# Patient Record
Sex: FEMALE | Race: WHITE | NOT HISPANIC OR LATINO | URBAN - METROPOLITAN AREA
[De-identification: names, ages, dates, MRNs, and addresses within clinical notes are randomized per-mention and may not be internally consistent; named-entity substitution may affect disease eponyms.]

---

## 2017-02-04 ENCOUNTER — OUTPATIENT (OUTPATIENT)
Dept: OUTPATIENT SERVICES | Facility: HOSPITAL | Age: 62
LOS: 1 days | Discharge: HOME | End: 2017-02-04

## 2017-06-27 DIAGNOSIS — Z12.31 ENCOUNTER FOR SCREENING MAMMOGRAM FOR MALIGNANT NEOPLASM OF BREAST: ICD-10-CM

## 2018-02-10 ENCOUNTER — OUTPATIENT (OUTPATIENT)
Dept: OUTPATIENT SERVICES | Facility: HOSPITAL | Age: 63
LOS: 1 days | Discharge: HOME | End: 2018-02-10

## 2018-02-10 DIAGNOSIS — Z12.31 ENCOUNTER FOR SCREENING MAMMOGRAM FOR MALIGNANT NEOPLASM OF BREAST: ICD-10-CM

## 2018-02-20 ENCOUNTER — OUTPATIENT (OUTPATIENT)
Dept: OUTPATIENT SERVICES | Facility: HOSPITAL | Age: 63
LOS: 1 days | Discharge: HOME | End: 2018-02-20

## 2018-02-20 DIAGNOSIS — R92.8 OTHER ABNORMAL AND INCONCLUSIVE FINDINGS ON DIAGNOSTIC IMAGING OF BREAST: ICD-10-CM

## 2018-08-20 ENCOUNTER — OUTPATIENT (OUTPATIENT)
Dept: OUTPATIENT SERVICES | Facility: HOSPITAL | Age: 63
LOS: 1 days | Discharge: HOME | End: 2018-08-20

## 2018-08-20 DIAGNOSIS — R92.8 OTHER ABNORMAL AND INCONCLUSIVE FINDINGS ON DIAGNOSTIC IMAGING OF BREAST: ICD-10-CM

## 2019-02-21 ENCOUNTER — OUTPATIENT (OUTPATIENT)
Dept: OUTPATIENT SERVICES | Facility: HOSPITAL | Age: 64
LOS: 1 days | Discharge: HOME | End: 2019-02-21

## 2019-02-21 DIAGNOSIS — R92.8 OTHER ABNORMAL AND INCONCLUSIVE FINDINGS ON DIAGNOSTIC IMAGING OF BREAST: ICD-10-CM

## 2020-12-09 ENCOUNTER — OUTPATIENT (OUTPATIENT)
Dept: OUTPATIENT SERVICES | Facility: HOSPITAL | Age: 65
LOS: 1 days | Discharge: HOME | End: 2020-12-09
Payer: COMMERCIAL

## 2020-12-09 ENCOUNTER — RESULT REVIEW (OUTPATIENT)
Age: 65
End: 2020-12-09

## 2020-12-09 DIAGNOSIS — Z12.31 ENCOUNTER FOR SCREENING MAMMOGRAM FOR MALIGNANT NEOPLASM OF BREAST: ICD-10-CM

## 2020-12-09 PROCEDURE — 77067 SCR MAMMO BI INCL CAD: CPT | Mod: 26

## 2020-12-09 PROCEDURE — 77063 BREAST TOMOSYNTHESIS BI: CPT | Mod: 26

## 2020-12-14 PROBLEM — Z00.00 ENCOUNTER FOR PREVENTIVE HEALTH EXAMINATION: Status: ACTIVE | Noted: 2020-12-14

## 2021-02-02 ENCOUNTER — APPOINTMENT (OUTPATIENT)
Dept: GASTROENTEROLOGY | Facility: CLINIC | Age: 66
End: 2021-02-02

## 2021-02-04 ENCOUNTER — APPOINTMENT (OUTPATIENT)
Dept: GASTROENTEROLOGY | Facility: CLINIC | Age: 66
End: 2021-02-04
Payer: COMMERCIAL

## 2021-02-04 DIAGNOSIS — Z86.39 PERSONAL HISTORY OF OTHER ENDOCRINE, NUTRITIONAL AND METABOLIC DISEASE: ICD-10-CM

## 2021-02-04 DIAGNOSIS — Z78.9 OTHER SPECIFIED HEALTH STATUS: ICD-10-CM

## 2021-02-04 DIAGNOSIS — Z82.49 FAMILY HISTORY OF ISCHEMIC HEART DISEASE AND OTHER DISEASES OF THE CIRCULATORY SYSTEM: ICD-10-CM

## 2021-02-04 PROCEDURE — 99204 OFFICE O/P NEW MOD 45 MIN: CPT | Mod: 95

## 2021-02-04 RX ORDER — ATORVASTATIN CALCIUM 10 MG/1
10 TABLET, FILM COATED ORAL
Qty: 90 | Refills: 0 | Status: ACTIVE | COMMUNITY
Start: 2020-12-21

## 2021-02-04 RX ORDER — CAL/D3/MAG11/ZINC/COP/MANG/BOR 600 MG-800
TABLET ORAL
Refills: 0 | Status: ACTIVE | COMMUNITY

## 2021-02-04 RX ORDER — LORATADINE 5 MG/5 ML
SOLUTION, ORAL ORAL
Refills: 0 | Status: ACTIVE | COMMUNITY

## 2021-02-04 NOTE — ASSESSMENT
[FreeTextEntry1] : 65 year old female patient average risk for CRC, presents for her first CRC screening colonoscopy. Denies any GI complaints. \par No blood in stools, weight loss or UGI sx. \par Reports hx of crampy pelvic pain 2 months ago, associated w bloating and diarrhea that currently resolved. An U/S of pelvis was ordered to show thickened bowel at LLQ, a CT was advised. \par \par 1- CRC screening, average risk\par colonoscopy scheduled \par Risks and benefits discussed with patient.\par \par \par 2- Thickened bowel on U/S\par Needs CT scan A/P w contrast to be done after colonoscopy.

## 2021-02-04 NOTE — HISTORY OF PRESENT ILLNESS
[Home] : at home, [unfilled] , at the time of the visit. [Verbal consent obtained from patient] : the patient, [unfilled] [Medical Office: ([facility])___] : at the medical office located in  [FreeTextEntry4] : Fabiana Shine [de-identified] : 65 year old female patient average risk for CRC, presents for her first CRC screening colonoscopy. Denies any GI complaints. \par No blood in stools, weight loss or UGI sx. \par Reports hx of crampy pelvic pain 2 months ago, associated w bloating and diarrhea that currently resolved. An U/S of pelvis was ordered to show thickened bowel at LLQ, a CT was advised.

## 2021-03-02 ENCOUNTER — LABORATORY RESULT (OUTPATIENT)
Age: 66
End: 2021-03-02

## 2021-03-02 ENCOUNTER — OUTPATIENT (OUTPATIENT)
Dept: OUTPATIENT SERVICES | Facility: HOSPITAL | Age: 66
LOS: 1 days | Discharge: HOME | End: 2021-03-02

## 2021-03-02 DIAGNOSIS — Z11.59 ENCOUNTER FOR SCREENING FOR OTHER VIRAL DISEASES: ICD-10-CM

## 2021-03-05 ENCOUNTER — RESULT REVIEW (OUTPATIENT)
Age: 66
End: 2021-03-05

## 2021-03-05 ENCOUNTER — OUTPATIENT (OUTPATIENT)
Dept: OUTPATIENT SERVICES | Facility: HOSPITAL | Age: 66
LOS: 1 days | Discharge: HOME | End: 2021-03-05
Payer: COMMERCIAL

## 2021-03-05 ENCOUNTER — TRANSCRIPTION ENCOUNTER (OUTPATIENT)
Age: 66
End: 2021-03-05

## 2021-03-05 VITALS
DIASTOLIC BLOOD PRESSURE: 76 MMHG | SYSTOLIC BLOOD PRESSURE: 134 MMHG | WEIGHT: 250 LBS | HEART RATE: 94 BPM | RESPIRATION RATE: 18 BRPM | HEIGHT: 65 IN | TEMPERATURE: 98 F | OXYGEN SATURATION: 100 %

## 2021-03-05 VITALS — SYSTOLIC BLOOD PRESSURE: 149 MMHG | RESPIRATION RATE: 17 BRPM | DIASTOLIC BLOOD PRESSURE: 85 MMHG | HEART RATE: 80 BPM

## 2021-03-05 DIAGNOSIS — Z98.890 OTHER SPECIFIED POSTPROCEDURAL STATES: Chronic | ICD-10-CM

## 2021-03-05 PROCEDURE — 45380 COLONOSCOPY AND BIOPSY: CPT | Mod: XU

## 2021-03-05 PROCEDURE — 88305 TISSUE EXAM BY PATHOLOGIST: CPT | Mod: 26

## 2021-03-05 PROCEDURE — 45385 COLONOSCOPY W/LESION REMOVAL: CPT

## 2021-03-05 NOTE — PACU DISCHARGE NOTE - COMMENTS
155/78 hr 76 rr 16 temp 37C o2 sat 100%  500ml LR given  pt is status post colonoscopy with biopsy under MAC  Discharge Home from Phase 2

## 2021-03-08 LAB — SURGICAL PATHOLOGY STUDY: SIGNIFICANT CHANGE UP

## 2021-03-10 DIAGNOSIS — D12.2 BENIGN NEOPLASM OF ASCENDING COLON: ICD-10-CM

## 2021-03-10 DIAGNOSIS — K64.8 OTHER HEMORRHOIDS: ICD-10-CM

## 2021-03-10 DIAGNOSIS — E66.9 OBESITY, UNSPECIFIED: ICD-10-CM

## 2021-03-10 DIAGNOSIS — D12.3 BENIGN NEOPLASM OF TRANSVERSE COLON: ICD-10-CM

## 2021-03-10 DIAGNOSIS — E78.00 PURE HYPERCHOLESTEROLEMIA, UNSPECIFIED: ICD-10-CM

## 2021-03-10 DIAGNOSIS — K57.30 DIVERTICULOSIS OF LARGE INTESTINE WITHOUT PERFORATION OR ABSCESS WITHOUT BLEEDING: ICD-10-CM

## 2021-03-10 DIAGNOSIS — R93.3 ABNORMAL FINDINGS ON DIAGNOSTIC IMAGING OF OTHER PARTS OF DIGESTIVE TRACT: ICD-10-CM

## 2021-05-03 PROBLEM — E78.00 PURE HYPERCHOLESTEROLEMIA, UNSPECIFIED: Chronic | Status: ACTIVE | Noted: 2021-03-05

## 2021-05-17 NOTE — PAST MEDICAL HISTORY
[Menarche Age ____] : age at menarche was [unfilled] [Menopause Age____] : age at menopause was [unfilled] [Total Preg ___] : G[unfilled] [Live Births ___] : P[unfilled]  [Full Term ___] : Full Term: [unfilled]

## 2021-05-17 NOTE — OB HISTORY
[Total Preg ___] : : [unfilled] [Full Term ___] : [unfilled] (full-term) [Living ___] : [unfilled] (living) [Menarche Age ____] : age at menarche was [unfilled] [Menopause  Age ____] : menopause occurred at age [unfilled]

## 2021-05-18 ENCOUNTER — APPOINTMENT (OUTPATIENT)
Dept: GYNECOLOGIC ONCOLOGY | Facility: CLINIC | Age: 66
End: 2021-05-18
Payer: COMMERCIAL

## 2021-05-18 ENCOUNTER — TRANSCRIPTION ENCOUNTER (OUTPATIENT)
Age: 66
End: 2021-05-18

## 2021-05-18 VITALS
HEIGHT: 65 IN | SYSTOLIC BLOOD PRESSURE: 136 MMHG | DIASTOLIC BLOOD PRESSURE: 88 MMHG | TEMPERATURE: 97.9 F | BODY MASS INDEX: 41.65 KG/M2 | WEIGHT: 250 LBS

## 2021-05-18 PROCEDURE — 99072 ADDL SUPL MATRL&STAF TM PHE: CPT

## 2021-05-18 PROCEDURE — 99203 OFFICE O/P NEW LOW 30 MIN: CPT

## 2021-05-18 NOTE — HISTORY OF PRESENT ILLNESS
[FreeTextEntry1] : 65 year old patient , referred by Dr. Shivani Pond, with a history of diverticulitis, left pelvic mass and a colovesical fistula.  A recent CT scan ordered  by GI Doctor (Dr. Espitia) noted a colovesicular fistula related to diverticulitis.  A colonoscopy was performed and 3 tubular adenomas removed.   An incidental finding  revealed a 6.0 x 3.3 cm left  complex cystic lesion.  An MRI was then performed confirming  a large complex cystic lesion left adnexa 7.4 x 3.9 x 5.0cm.  Largely cystic but with solid components.  Her   is negative (7).  Out of season

## 2021-05-18 NOTE — ASSESSMENT
[FreeTextEntry1] : 65 year old patient , referred by Dr. Shivani Pond, with a history of diverticulitis, left pelvic mass and a colovesical fistula.  A recent CT scan ordered  by GI Doctor (Dr. Espitia) noted a colovesicular fistula related to diverticulitis.  A colonoscopy was performed and 3 tubular adenomas removed.   An incidental finding  revealed a 6.0 x 3.3 cm left  complex cystic lesion.  An MRI was then performed confirming  a large complex cystic lesion left adnexa 7.4 x 3.9 x 5.0cm.  Largely cystic but with solid components.  Her   is negative (7).

## 2021-06-07 ENCOUNTER — NON-APPOINTMENT (OUTPATIENT)
Age: 66
End: 2021-06-07

## 2021-06-07 ENCOUNTER — APPOINTMENT (OUTPATIENT)
Dept: SURGERY | Facility: CLINIC | Age: 66
End: 2021-06-07
Payer: COMMERCIAL

## 2021-06-07 VITALS
BODY MASS INDEX: 41.82 KG/M2 | OXYGEN SATURATION: 95 % | HEIGHT: 65 IN | WEIGHT: 251 LBS | DIASTOLIC BLOOD PRESSURE: 96 MMHG | TEMPERATURE: 97.8 F | SYSTOLIC BLOOD PRESSURE: 132 MMHG | HEART RATE: 78 BPM

## 2021-06-07 PROCEDURE — 99204 OFFICE O/P NEW MOD 45 MIN: CPT

## 2021-06-07 PROCEDURE — 99072 ADDL SUPL MATRL&STAF TM PHE: CPT

## 2021-06-12 NOTE — ASSESSMENT
[FreeTextEntry1] : 65F with complicated sigmoid diverticulitis, colovesical fistula and left adnexal mass\par \par I had a long conversation with the patient regarding her pathology. I discussed her care with Dr. Gutierrez.  We will plan for a combined resection.  The patient will be evaluated by Dr. Rojo of Urology to be available in case she requires an extensive bladder repair. I recommended that he undergo robot assisted laparoscopic sigmoidectomy possible open, possible ostomy.  All risks benefits and alternatives were discussed including bleeding, infection, damage to surrounding structures including the ureters, anastomotic leak, anastomotic stricture, cardiopulmonary events, thromboembolic events and hernia. We expect a 3-7 day hospital stay and 6-8 week recovery. Patient expressed understanding and was in agreement with the plan.\par \par I personally reviewed records including notes, CT scan, colonoscopy report and pathology.\par

## 2021-06-12 NOTE — HISTORY OF PRESENT ILLNESS
[FreeTextEntry1] : Patient is a 65F with PMH of HLD, colon polyps, ovarian mass who presents for evaluation of complicated sigmoid diverticulitis with colovesicular fistula.  Patient originally presented to Dr. Flores with lower abdominal pain CT showed sigmoid diverticulitis with colovesicular fistula as well as a left adnexal complex cystic lesion. She underwent colonoscopy on 3/5 that showed sigmoid diverticulosis and 3 TA.  Dr. Gutierrez is planing for laparoscopic resection.  She presents today for evaluation.  Patient reports crampy lower abdominal pain and pneumaturia.  Denies current UTI symptoms.  Patient denies fevers, chills, nausea, vomiting, constipation, diarrhea, blood in her stool or unexpected weight loss.  Patient denies a family history of colon cancer rectal cancer or inflammatory bowel disease.

## 2021-06-12 NOTE — CONSULT LETTER
[Dear  ___] : Dear  [unfilled], [Consult Letter:] : I had the pleasure of evaluating your patient, [unfilled]. [Please see my note below.] : Please see my note below. [Consult Closing:] : Thank you very much for allowing me to participate in the care of this patient.  If you have any questions, please do not hesitate to contact me. [FreeTextEntry3] : Sincerely,\par \par Lon Mulligan MD, Colon and Rectal Surgery\par \par [employer] [facility]\par [address] [place] [address]\par [facility], 3rd Floor\par [place], [place] [address]\par Tel [phone] ext [phone]\par Fax [phone]\par  [DrJenn  ___] : Dr. DALAL [DrJenn ___] : Dr. DALAL

## 2021-06-30 ENCOUNTER — NON-APPOINTMENT (OUTPATIENT)
Age: 66
End: 2021-06-30

## 2021-06-30 DIAGNOSIS — G89.29 OTHER CHRONIC PAIN: ICD-10-CM

## 2021-06-30 RX ORDER — SODIUM PICOSULFATE, MAGNESIUM OXIDE, AND ANHYDROUS CITRIC ACID 10; 3.5; 12 MG/160ML; G/160ML; G/160ML
10-3.5-12 MG-GM LIQUID ORAL
Qty: 1 | Refills: 0 | Status: DISCONTINUED | COMMUNITY
Start: 2021-02-04 | End: 2021-06-30

## 2021-07-06 ENCOUNTER — APPOINTMENT (OUTPATIENT)
Dept: UROLOGY | Facility: CLINIC | Age: 66
End: 2021-07-06
Payer: COMMERCIAL

## 2021-07-06 VITALS — HEIGHT: 65 IN | BODY MASS INDEX: 41.65 KG/M2 | WEIGHT: 250 LBS

## 2021-07-06 PROCEDURE — 99072 ADDL SUPL MATRL&STAF TM PHE: CPT

## 2021-07-06 PROCEDURE — 99215 OFFICE O/P EST HI 40 MIN: CPT

## 2021-07-07 NOTE — ASSESSMENT
[FreeTextEntry1] : JEROME WILDER is a 66 year old female with complex ovarian cystic lesion who presents for consultation for complicated sigmoid diverticulitis with colovesicular fistula\par \par We discussed that patient will likely need bladder repair at the time of her sigmoidectomy.  \par We discussed that this is sometimes done by general surgery however urology will be available in case we are needed.\par Discussed the risks of this procedure including infection, bleeding, injury to surrounding structures (i.e. ureters) need for Collins catheterization.  Also discussed potentially higher risk of requiring prolonged catheterization given the bladder tissue was likely friable and may need more time for healing.  Also discussed potential high risk of irritative voiding symptoms and low capacity bladder postoperatively.\par \par Urinalysis urine culture today no plans on treatment at this time as patient is asymptomatic other than pneumaturia\par \par addendum -- spoke w dr vega 7/7/21 who clarified that if there is a need, then urology will be called intraoperatively to assist w bladder repair

## 2021-07-07 NOTE — PHYSICAL EXAM

## 2021-07-07 NOTE — HISTORY OF PRESENT ILLNESS
[FreeTextEntry1] : JEROME WILDER is a 66 year old female who presents for consultation for complicated sigmoid diverticulitis with colovesicular fistula. \par The pt is scheduled for Sigmoidectomy and possible need for Urology, GYN Onc during the time for surgery. \par \par Pt reports she has been experiencing pneumaturia since 3/2021. \par Pt reports she has been experiencing daily lower abdominal cramps since October 2020. \par Pt was diagnosed with ovarian cysts, and the colovesical fistula was an incidental finding.\par Denies gross hematuria, dysuria or associated symptoms. \par Pt reports the urine is not malodorous. \par \par Denies  PMH including previous kidney stones, recurrent UTIs. \par Family History: No  malignancies\par Social History: Works for a shipping company.\par \par CT ap images visualized from 3/2021: long segment thickening of the sigmoid colon in the region of diverticula with adjacent fat stranding, colovesical fistula (at bladder dome), no hydro and soft tissue extending on the Lt adnexa where there is a complex cystic lesion measuring 6 containing fluid and soft tissue components.\par \par MRI from 4/2021 images visualized: Rt ovarian simple cyst measuring 9 mm. Left adnexa: large complex cystic lesion, measuring 7 cm, worrisome for ovarian cancer.  \par Long segment of sigmoid colon with diverticulitis, resulting in colovesicular fistula. \par \par Notes reviewed from:Dr Mulligan/Matt\par Patient is scheduled for a Sigmoidectomy possibly combined procedure with Serur and Urology. \par diagnostic laparoscopy,  possible laparoscopic BSO, possible laparoscopic hysterectomy along with possible colon resection and colovesical fistula excision and repair.  \par \par

## 2021-07-21 ENCOUNTER — NON-APPOINTMENT (OUTPATIENT)
Age: 66
End: 2021-07-21

## 2021-07-29 ENCOUNTER — OUTPATIENT (OUTPATIENT)
Dept: OUTPATIENT SERVICES | Facility: HOSPITAL | Age: 66
LOS: 1 days | Discharge: HOME | End: 2021-07-29
Payer: COMMERCIAL

## 2021-07-29 ENCOUNTER — RESULT REVIEW (OUTPATIENT)
Age: 66
End: 2021-07-29

## 2021-07-29 VITALS
HEART RATE: 89 BPM | OXYGEN SATURATION: 97 % | SYSTOLIC BLOOD PRESSURE: 136 MMHG | DIASTOLIC BLOOD PRESSURE: 82 MMHG | TEMPERATURE: 97 F | WEIGHT: 248.02 LBS | RESPIRATION RATE: 19 BRPM | HEIGHT: 66 IN

## 2021-07-29 DIAGNOSIS — N32.1 VESICOINTESTINAL FISTULA: ICD-10-CM

## 2021-07-29 DIAGNOSIS — Z98.41 CATARACT EXTRACTION STATUS, RIGHT EYE: Chronic | ICD-10-CM

## 2021-07-29 DIAGNOSIS — Z01.818 ENCOUNTER FOR OTHER PREPROCEDURAL EXAMINATION: ICD-10-CM

## 2021-07-29 DIAGNOSIS — Z98.890 OTHER SPECIFIED POSTPROCEDURAL STATES: Chronic | ICD-10-CM

## 2021-07-29 LAB
A1C WITH ESTIMATED AVERAGE GLUCOSE RESULT: 6.2 % — HIGH (ref 4–5.6)
ALBUMIN SERPL ELPH-MCNC: 3.2 G/DL — LOW (ref 3.5–5.2)
ALP SERPL-CCNC: 165 U/L — HIGH (ref 30–115)
ALT FLD-CCNC: 20 U/L — SIGNIFICANT CHANGE UP (ref 0–41)
ANION GAP SERPL CALC-SCNC: 12 MMOL/L — SIGNIFICANT CHANGE UP (ref 7–14)
APPEARANCE UR: ABNORMAL
APTT BLD: 33.1 SEC — SIGNIFICANT CHANGE UP (ref 27–39.2)
AST SERPL-CCNC: 18 U/L — SIGNIFICANT CHANGE UP (ref 0–41)
BACTERIA # UR AUTO: ABNORMAL
BASOPHILS # BLD AUTO: 0.05 K/UL — SIGNIFICANT CHANGE UP (ref 0–0.2)
BASOPHILS NFR BLD AUTO: 0.3 % — SIGNIFICANT CHANGE UP (ref 0–1)
BILIRUB SERPL-MCNC: 0.4 MG/DL — SIGNIFICANT CHANGE UP (ref 0.2–1.2)
BILIRUB UR-MCNC: NEGATIVE — SIGNIFICANT CHANGE UP
BLD GP AB SCN SERPL QL: SIGNIFICANT CHANGE UP
BUN SERPL-MCNC: 16 MG/DL — SIGNIFICANT CHANGE UP (ref 10–20)
CALCIUM SERPL-MCNC: 9.7 MG/DL — SIGNIFICANT CHANGE UP (ref 8.5–10.1)
CHLORIDE SERPL-SCNC: 100 MMOL/L — SIGNIFICANT CHANGE UP (ref 98–110)
CO2 SERPL-SCNC: 25 MMOL/L — SIGNIFICANT CHANGE UP (ref 17–32)
COLOR SPEC: ABNORMAL
CREAT SERPL-MCNC: 0.8 MG/DL — SIGNIFICANT CHANGE UP (ref 0.7–1.5)
DIFF PNL FLD: ABNORMAL
EOSINOPHIL # BLD AUTO: 0.07 K/UL — SIGNIFICANT CHANGE UP (ref 0–0.7)
EOSINOPHIL NFR BLD AUTO: 0.5 % — SIGNIFICANT CHANGE UP (ref 0–8)
EPI CELLS # UR: 6 /HPF — HIGH (ref 0–5)
ESTIMATED AVERAGE GLUCOSE: 131 MG/DL — HIGH (ref 68–114)
GLUCOSE SERPL-MCNC: 94 MG/DL — SIGNIFICANT CHANGE UP (ref 70–99)
GLUCOSE UR QL: NEGATIVE — SIGNIFICANT CHANGE UP
HCT VFR BLD CALC: 38.8 % — SIGNIFICANT CHANGE UP (ref 37–47)
HGB BLD-MCNC: 12.1 G/DL — SIGNIFICANT CHANGE UP (ref 12–16)
HYALINE CASTS # UR AUTO: 23 /LPF — HIGH (ref 0–7)
IMM GRANULOCYTES NFR BLD AUTO: 0.5 % — HIGH (ref 0.1–0.3)
INR BLD: 1.18 RATIO — SIGNIFICANT CHANGE UP (ref 0.65–1.3)
KETONES UR-MCNC: NEGATIVE — SIGNIFICANT CHANGE UP
LEUKOCYTE ESTERASE UR-ACNC: ABNORMAL
LYMPHOCYTES # BLD AUTO: 1.38 K/UL — SIGNIFICANT CHANGE UP (ref 1.2–3.4)
LYMPHOCYTES # BLD AUTO: 9.1 % — LOW (ref 20.5–51.1)
MCHC RBC-ENTMCNC: 27.2 PG — SIGNIFICANT CHANGE UP (ref 27–31)
MCHC RBC-ENTMCNC: 31.2 G/DL — LOW (ref 32–37)
MCV RBC AUTO: 87.2 FL — SIGNIFICANT CHANGE UP (ref 81–99)
MONOCYTES # BLD AUTO: 1.47 K/UL — HIGH (ref 0.1–0.6)
MONOCYTES NFR BLD AUTO: 9.7 % — HIGH (ref 1.7–9.3)
MRSA PCR RESULT.: NEGATIVE — SIGNIFICANT CHANGE UP
NEUTROPHILS # BLD AUTO: 12.14 K/UL — HIGH (ref 1.4–6.5)
NEUTROPHILS NFR BLD AUTO: 79.9 % — HIGH (ref 42.2–75.2)
NITRITE UR-MCNC: NEGATIVE — SIGNIFICANT CHANGE UP
NRBC # BLD: 0 /100 WBCS — SIGNIFICANT CHANGE UP (ref 0–0)
PH UR: 6 — SIGNIFICANT CHANGE UP (ref 5–8)
PLATELET # BLD AUTO: 458 K/UL — HIGH (ref 130–400)
POTASSIUM SERPL-MCNC: 4.7 MMOL/L — SIGNIFICANT CHANGE UP (ref 3.5–5)
POTASSIUM SERPL-SCNC: 4.7 MMOL/L — SIGNIFICANT CHANGE UP (ref 3.5–5)
PROT SERPL-MCNC: 6.6 G/DL — SIGNIFICANT CHANGE UP (ref 6–8)
PROT UR-MCNC: ABNORMAL
PROTHROM AB SERPL-ACNC: 13.6 SEC — HIGH (ref 9.95–12.87)
RBC # BLD: 4.45 M/UL — SIGNIFICANT CHANGE UP (ref 4.2–5.4)
RBC # FLD: 14.6 % — HIGH (ref 11.5–14.5)
RBC CASTS # UR COMP ASSIST: 195 /HPF — HIGH (ref 0–4)
SODIUM SERPL-SCNC: 137 MMOL/L — SIGNIFICANT CHANGE UP (ref 135–146)
SP GR SPEC: 1.03 — SIGNIFICANT CHANGE UP (ref 1.01–1.03)
UROBILINOGEN FLD QL: SIGNIFICANT CHANGE UP
WBC # BLD: 15.18 K/UL — HIGH (ref 4.8–10.8)
WBC # FLD AUTO: 15.18 K/UL — HIGH (ref 4.8–10.8)
WBC UR QL: >720 /HPF — HIGH (ref 0–5)

## 2021-07-29 PROCEDURE — 71046 X-RAY EXAM CHEST 2 VIEWS: CPT | Mod: 26

## 2021-07-29 PROCEDURE — 93010 ELECTROCARDIOGRAM REPORT: CPT

## 2021-07-29 NOTE — H&P PST ADULT - NSICDXPASTSURGICALHX_GEN_ALL_CORE_FT
PAST SURGICAL HISTORY:  History of surgery back & neck cyst removal, cholecystecomy,    S/P bilateral cataract extraction

## 2021-07-29 NOTE — H&P PST ADULT - REASON FOR ADMISSION
Patient is a 64 y/o female here for PAST. Patient reports HLD, colon polyps, ovarian mass who presents for evaluation of complicated sigmoid diverticulitis with colovesicular fistula. Patient originally presented to Dr. Flores with lower abdominal pain CT showed sigmoid diverticulitis with colovesicular fistula as well as a left adnexal complex cystic lesion. She underwent colonoscopy on 3/5 that showed sigmoid diverticulosis and 3 TA. Dr. Gutierrez is planing for laparoscopic resection. Patient reports crampy lower abdominal pain and pneumaturia. Now for scheduled robot assisted sigmoidectomy.

## 2021-07-29 NOTE — H&P PST ADULT - ATTENDING COMMENTS
66F with sigmoid diverticulitis and colovesicle fistula for robot assisted laparoscopic sigmoidectomy

## 2021-07-29 NOTE — H&P PST ADULT - HISTORY OF PRESENT ILLNESS
PATIENT DENIES CHEST PAIN, SHORTNESS OF BREATH, PALPITATIONS, COUGHING, FEVER, DYSURIA.  CAN WALK UP 2-3 FLIGHTS OF STEPS WITHOUT SOB.    NO COUGH, FEVER, SORE THROAT, HEADACHE, LOSS OF TASTE OR SMELL. NO KNOWN EXPOSURE TO ANYONE WITH COVID. PATIENT WAS INSTRUCTED TO ISOLATE FROM NOW UNTIL THE SURGERY.    Anesthesia Alert  + Difficult Airway (CLASS IV)  NO--History of neck surgery or radiation  NO--Limited ROM of neck  NO--History of Malignant hyperthermia  NO--Personal or family history of Pseudocholinesterase deficiency  NO--Prior Anesthesia Complication  NO--Latex Allergy  NO--Loose teeth  NO--History of Rheumatoid Arthritis  ?? ARTHUR (+snorring)  NO--bleeding risk

## 2021-07-29 NOTE — H&P PST ADULT - NSICDXFAMILYHX_GEN_ALL_CORE_FT
FAMILY HISTORY:  Father  Still living? No  FH: Alzheimers disease, Age at diagnosis: Age Unknown    Mother  Still living? No  FH: rheumatoid arthritis, Age at diagnosis: Age Unknown

## 2021-08-05 ENCOUNTER — NON-APPOINTMENT (OUTPATIENT)
Age: 66
End: 2021-08-05

## 2021-08-12 ENCOUNTER — APPOINTMENT (OUTPATIENT)
Dept: UROLOGY | Facility: CLINIC | Age: 66
End: 2021-08-12

## 2021-08-16 ENCOUNTER — OUTPATIENT (OUTPATIENT)
Dept: OUTPATIENT SERVICES | Facility: HOSPITAL | Age: 66
LOS: 1 days | Discharge: HOME | End: 2021-08-16

## 2021-08-16 ENCOUNTER — LABORATORY RESULT (OUTPATIENT)
Age: 66
End: 2021-08-16

## 2021-08-16 DIAGNOSIS — Z98.890 OTHER SPECIFIED POSTPROCEDURAL STATES: Chronic | ICD-10-CM

## 2021-08-16 DIAGNOSIS — Z11.59 ENCOUNTER FOR SCREENING FOR OTHER VIRAL DISEASES: ICD-10-CM

## 2021-08-16 DIAGNOSIS — Z98.41 CATARACT EXTRACTION STATUS, RIGHT EYE: Chronic | ICD-10-CM

## 2021-08-16 PROBLEM — E66.01 MORBID (SEVERE) OBESITY DUE TO EXCESS CALORIES: Chronic | Status: ACTIVE | Noted: 2021-07-29

## 2021-08-18 ENCOUNTER — NON-APPOINTMENT (OUTPATIENT)
Age: 66
End: 2021-08-18

## 2021-08-19 ENCOUNTER — RESULT REVIEW (OUTPATIENT)
Age: 66
End: 2021-08-19

## 2021-08-19 ENCOUNTER — INPATIENT (INPATIENT)
Facility: HOSPITAL | Age: 66
LOS: 5 days | Discharge: HOME | End: 2021-08-25
Attending: COLON & RECTAL SURGERY | Admitting: COLON & RECTAL SURGERY
Payer: COMMERCIAL

## 2021-08-19 ENCOUNTER — APPOINTMENT (OUTPATIENT)
Dept: SURGERY | Facility: HOSPITAL | Age: 66
End: 2021-08-19

## 2021-08-19 ENCOUNTER — APPOINTMENT (OUTPATIENT)
Dept: GYNECOLOGIC ONCOLOGY | Facility: HOSPITAL | Age: 66
End: 2021-08-19
Payer: COMMERCIAL

## 2021-08-19 VITALS
HEART RATE: 90 BPM | WEIGHT: 240.97 LBS | HEIGHT: 65 IN | DIASTOLIC BLOOD PRESSURE: 65 MMHG | SYSTOLIC BLOOD PRESSURE: 114 MMHG | TEMPERATURE: 97 F | RESPIRATION RATE: 16 BRPM | OXYGEN SATURATION: 99 %

## 2021-08-19 DIAGNOSIS — E78.5 HYPERLIPIDEMIA, UNSPECIFIED: ICD-10-CM

## 2021-08-19 DIAGNOSIS — Z98.890 OTHER SPECIFIED POSTPROCEDURAL STATES: Chronic | ICD-10-CM

## 2021-08-19 DIAGNOSIS — E66.01 MORBID (SEVERE) OBESITY DUE TO EXCESS CALORIES: ICD-10-CM

## 2021-08-19 DIAGNOSIS — N32.1 VESICOINTESTINAL FISTULA: ICD-10-CM

## 2021-08-19 DIAGNOSIS — Z98.41 CATARACT EXTRACTION STATUS, RIGHT EYE: Chronic | ICD-10-CM

## 2021-08-19 DIAGNOSIS — K57.20 DIVERTICULITIS OF LARGE INTESTINE WITH PERFORATION AND ABSCESS WITHOUT BLEEDING: ICD-10-CM

## 2021-08-19 DIAGNOSIS — N83.209 UNSPECIFIED OVARIAN CYST, UNSPECIFIED SIDE: ICD-10-CM

## 2021-08-19 DIAGNOSIS — N70.93 SALPINGITIS AND OOPHORITIS, UNSPECIFIED: ICD-10-CM

## 2021-08-19 LAB
ABO RH CONFIRMATION: SIGNIFICANT CHANGE UP
ANION GAP SERPL CALC-SCNC: 11 MMOL/L — SIGNIFICANT CHANGE UP (ref 7–14)
BUN SERPL-MCNC: 9 MG/DL — LOW (ref 10–20)
CALCIUM SERPL-MCNC: 8.9 MG/DL — SIGNIFICANT CHANGE UP (ref 8.5–10.1)
CHLORIDE SERPL-SCNC: 104 MMOL/L — SIGNIFICANT CHANGE UP (ref 98–110)
CO2 SERPL-SCNC: 23 MMOL/L — SIGNIFICANT CHANGE UP (ref 17–32)
CREAT SERPL-MCNC: 0.8 MG/DL — SIGNIFICANT CHANGE UP (ref 0.7–1.5)
GLUCOSE BLDC GLUCOMTR-MCNC: 119 MG/DL — HIGH (ref 70–99)
GLUCOSE BLDC GLUCOMTR-MCNC: 165 MG/DL — HIGH (ref 70–99)
GLUCOSE SERPL-MCNC: 171 MG/DL — HIGH (ref 70–99)
HCT VFR BLD CALC: 40.6 % — SIGNIFICANT CHANGE UP (ref 37–47)
HGB BLD-MCNC: 12.4 G/DL — SIGNIFICANT CHANGE UP (ref 12–16)
MAGNESIUM SERPL-MCNC: 1.8 MG/DL — SIGNIFICANT CHANGE UP (ref 1.8–2.4)
MCHC RBC-ENTMCNC: 27.3 PG — SIGNIFICANT CHANGE UP (ref 27–31)
MCHC RBC-ENTMCNC: 30.5 G/DL — LOW (ref 32–37)
MCV RBC AUTO: 89.4 FL — SIGNIFICANT CHANGE UP (ref 81–99)
NRBC # BLD: 0 /100 WBCS — SIGNIFICANT CHANGE UP (ref 0–0)
PHOSPHATE SERPL-MCNC: 4.1 MG/DL — SIGNIFICANT CHANGE UP (ref 2.1–4.9)
PLATELET # BLD AUTO: 397 K/UL — SIGNIFICANT CHANGE UP (ref 130–400)
POTASSIUM SERPL-MCNC: 4.5 MMOL/L — SIGNIFICANT CHANGE UP (ref 3.5–5)
POTASSIUM SERPL-SCNC: 4.5 MMOL/L — SIGNIFICANT CHANGE UP (ref 3.5–5)
RBC # BLD: 4.54 M/UL — SIGNIFICANT CHANGE UP (ref 4.2–5.4)
RBC # FLD: 15.5 % — HIGH (ref 11.5–14.5)
SODIUM SERPL-SCNC: 138 MMOL/L — SIGNIFICANT CHANGE UP (ref 135–146)
WBC # BLD: 18.4 K/UL — HIGH (ref 4.8–10.8)
WBC # FLD AUTO: 18.4 K/UL — HIGH (ref 4.8–10.8)

## 2021-08-19 PROCEDURE — 44145 PARTIAL REMOVAL OF COLON: CPT

## 2021-08-19 PROCEDURE — 88304 TISSUE EXAM BY PATHOLOGIST: CPT | Mod: 26

## 2021-08-19 PROCEDURE — 45330 DIAGNOSTIC SIGMOIDOSCOPY: CPT | Mod: 59

## 2021-08-19 PROCEDURE — 58150 TOTAL HYSTERECTOMY: CPT

## 2021-08-19 PROCEDURE — 88307 TISSUE EXAM BY PATHOLOGIST: CPT | Mod: 26

## 2021-08-19 PROCEDURE — 88342 IMHCHEM/IMCYTCHM 1ST ANTB: CPT | Mod: 26

## 2021-08-19 RX ORDER — HEPARIN SODIUM 5000 [USP'U]/ML
5000 INJECTION INTRAVENOUS; SUBCUTANEOUS EVERY 8 HOURS
Refills: 0 | Status: DISCONTINUED | OUTPATIENT
Start: 2021-08-19 | End: 2021-08-20

## 2021-08-19 RX ORDER — GABAPENTIN 400 MG/1
300 CAPSULE ORAL ONCE
Refills: 0 | Status: COMPLETED | OUTPATIENT
Start: 2021-08-19 | End: 2021-08-19

## 2021-08-19 RX ORDER — ASPIRIN/CALCIUM CARB/MAGNESIUM 324 MG
1 TABLET ORAL
Qty: 0 | Refills: 0 | DISCHARGE

## 2021-08-19 RX ORDER — SODIUM CHLORIDE 9 MG/ML
1000 INJECTION, SOLUTION INTRAVENOUS
Refills: 0 | Status: DISCONTINUED | OUTPATIENT
Start: 2021-08-19 | End: 2021-08-20

## 2021-08-19 RX ORDER — PANTOPRAZOLE SODIUM 20 MG/1
40 TABLET, DELAYED RELEASE ORAL DAILY
Refills: 0 | Status: DISCONTINUED | OUTPATIENT
Start: 2021-08-19 | End: 2021-08-23

## 2021-08-19 RX ORDER — SODIUM CHLORIDE 9 MG/ML
1000 INJECTION, SOLUTION INTRAVENOUS
Refills: 0 | Status: DISCONTINUED | OUTPATIENT
Start: 2021-08-19 | End: 2021-08-19

## 2021-08-19 RX ORDER — ACETAMINOPHEN 500 MG
650 TABLET ORAL EVERY 6 HOURS
Refills: 0 | Status: DISCONTINUED | OUTPATIENT
Start: 2021-08-19 | End: 2021-08-25

## 2021-08-19 RX ORDER — NALOXONE HYDROCHLORIDE 4 MG/.1ML
0.1 SPRAY NASAL
Refills: 0 | Status: DISCONTINUED | OUTPATIENT
Start: 2021-08-19 | End: 2021-08-21

## 2021-08-19 RX ORDER — ACETAMINOPHEN 500 MG
1000 TABLET ORAL ONCE
Refills: 0 | Status: DISCONTINUED | OUTPATIENT
Start: 2021-08-19 | End: 2021-08-19

## 2021-08-19 RX ORDER — HYDROMORPHONE HYDROCHLORIDE 2 MG/ML
0.5 INJECTION INTRAMUSCULAR; INTRAVENOUS; SUBCUTANEOUS
Refills: 0 | Status: DISCONTINUED | OUTPATIENT
Start: 2021-08-19 | End: 2021-08-19

## 2021-08-19 RX ORDER — ONDANSETRON 8 MG/1
4 TABLET, FILM COATED ORAL EVERY 6 HOURS
Refills: 0 | Status: DISCONTINUED | OUTPATIENT
Start: 2021-08-19 | End: 2021-08-25

## 2021-08-19 RX ORDER — LORATADINE 10 MG/1
1 TABLET ORAL
Qty: 0 | Refills: 0 | DISCHARGE

## 2021-08-19 RX ORDER — CALCIUM CARBONATE 500(1250)
2 TABLET ORAL
Qty: 0 | Refills: 0 | DISCHARGE

## 2021-08-19 RX ORDER — CEFOTETAN DISODIUM 1 G
2 VIAL (EA) INJECTION EVERY 12 HOURS
Refills: 0 | Status: COMPLETED | OUTPATIENT
Start: 2021-08-19 | End: 2021-08-20

## 2021-08-19 RX ORDER — BUPIVACAINE 13.3 MG/ML
20 INJECTION, SUSPENSION, LIPOSOMAL INFILTRATION ONCE
Refills: 0 | Status: DISCONTINUED | OUTPATIENT
Start: 2021-08-19 | End: 2021-08-19

## 2021-08-19 RX ORDER — ATORVASTATIN CALCIUM 80 MG/1
1 TABLET, FILM COATED ORAL
Qty: 0 | Refills: 0 | DISCHARGE

## 2021-08-19 RX ORDER — HYDROMORPHONE HYDROCHLORIDE 2 MG/ML
30 INJECTION INTRAMUSCULAR; INTRAVENOUS; SUBCUTANEOUS
Refills: 0 | Status: DISCONTINUED | OUTPATIENT
Start: 2021-08-19 | End: 2021-08-21

## 2021-08-19 RX ORDER — ACETAMINOPHEN 500 MG
1000 TABLET ORAL ONCE
Refills: 0 | Status: COMPLETED | OUTPATIENT
Start: 2021-08-19 | End: 2021-08-19

## 2021-08-19 RX ORDER — HYDROMORPHONE HYDROCHLORIDE 2 MG/ML
1 INJECTION INTRAMUSCULAR; INTRAVENOUS; SUBCUTANEOUS ONCE
Refills: 0 | Status: DISCONTINUED | OUTPATIENT
Start: 2021-08-19 | End: 2021-08-21

## 2021-08-19 RX ORDER — HEPARIN SODIUM 5000 [USP'U]/ML
5000 INJECTION INTRAVENOUS; SUBCUTANEOUS ONCE
Refills: 0 | Status: COMPLETED | OUTPATIENT
Start: 2021-08-19 | End: 2021-08-19

## 2021-08-19 RX ADMIN — Medication 1000 MILLIGRAM(S): at 06:45

## 2021-08-19 RX ADMIN — GABAPENTIN 300 MILLIGRAM(S): 400 CAPSULE ORAL at 07:00

## 2021-08-19 RX ADMIN — HEPARIN SODIUM 5000 UNIT(S): 5000 INJECTION INTRAVENOUS; SUBCUTANEOUS at 06:41

## 2021-08-19 RX ADMIN — HYDROMORPHONE HYDROCHLORIDE 30 MILLILITER(S): 2 INJECTION INTRAMUSCULAR; INTRAVENOUS; SUBCUTANEOUS at 17:46

## 2021-08-19 RX ADMIN — Medication 650 MILLIGRAM(S): at 23:26

## 2021-08-19 RX ADMIN — HEPARIN SODIUM 5000 UNIT(S): 5000 INJECTION INTRAVENOUS; SUBCUTANEOUS at 21:18

## 2021-08-19 RX ADMIN — Medication 100 GRAM(S): at 21:18

## 2021-08-19 NOTE — BRIEF OPERATIVE NOTE - NSICDXBRIEFPOSTOP_GEN_ALL_CORE_FT
POST-OP DIAGNOSIS:  Diverticulitis 19-Aug-2021 12:54:42  Sarah Bermudez  
POST-OP DIAGNOSIS:  Colonic fistula 19-Aug-2021 15:51:25  Jacob Sherwood

## 2021-08-19 NOTE — BRIEF OPERATIVE NOTE - NSICDXBRIEFPREOP_GEN_ALL_CORE_FT
PRE-OP DIAGNOSIS:  Colonic fistula 19-Aug-2021 15:50:53  Jacob Sherwood  
PRE-OP DIAGNOSIS:  Diverticulitis 19-Aug-2021 12:54:23  Sarah Bermudez

## 2021-08-19 NOTE — CHART NOTE - NSCHARTNOTEFT_GEN_A_CORE
Post Operative Note  Patient: JEROME WILDER 66y (15-Hunter-1955) Female   MRN: [id number]  Location: [address]  Visit: 08-19-21 Inpatient  Date: 08-19-21 @ 20:00    Procedure: Diverticulitis    Colonic fistula     S/P Hysterectomy, total, abdominal, with BSO    Laparoscopic low anterior resection of rectum and sigmoid colon with conversion to open procedure if indicated    Flexible sigmoidoscopy    Repair of serosal tear of small intestine    Lysis of intestinal adhesions    Lysis of adhesions of peritoneum    Block, transversus abdominis plane, bilateral        Subjective:   Nausea:  no, Vomiting:  no, Ambulating:  no, Flatus:  no  Pain Assessment: Patient is complaining of mild pain around incision sites, that is appropriate for post-operative course.     Objective:  Vitals: T(F): 96.8 (08-19-21 @ 21:00), Max: 97.2 (08-19-21 @ 15:20)  HR: 97 (08-19-21 @ 21:00)  BP: 144/75 (08-19-21 @ 21:00) (114/65 - 183/84)  RR: 18 (08-19-21 @ 21:00)  SpO2: 96% (08-19-21 @ 21:00)  Vent Settings:     In:   08-19-21 @ 07:01  -  08-19-21 @ 21:56  --------------------------------------------------------  IN: 0 mL      IV Fluids: lactated ringers. 1000 milliLiter(s) (125 mL/Hr) IV Continuous <Continuous>      Out:   08-19-21 @ 07:01  -  08-19-21 @ 21:56  --------------------------------------------------------  OUT: 720 mL      EBL:     Voided Urine:   08-19-21 @ 07:01  -  08-19-21 @ 21:56  --------------------------------------------------------  OUT: 720 mL      Collins Catheter: yes n  Drains:   LOGAN drain   08-19-21 @ 07:01  -  08-19-21 @ 21:56  --------------------------------------------------------  OUT: 20 mL          Physical Examination:  General Appearance: NAD,    HEENT: EOMI, sclera non-icteric.  Heart: RRR  Lungs: CTABL  Abdomen:  Soft, mildly tender around incision sites, appropriate for post-op course, nondistended. No rigidity, guarding, or rebound tenderness.   MSK/Extremities: Warm & well-perfused. Peripheral pulses intact.  Skin: Warm, dry. No jaundice.   Incisions/Wounds: Dressings in place, clean, dry and intact, no signs of infection/active bleeding/drainage    Medications: [Standing]  acetaminophen   Tablet .. 650 milliGRAM(s) Oral every 6 hours  cefoTEtan  IVPB 2 Gram(s) IV Intermittent every 12 hours  heparin   Injectable 5000 Unit(s) SubCutaneous every 8 hours  HYDROmorphone  Injectable 1 milliGRAM(s) IV Push once PRN  HYDROmorphone PCA (1 mG/mL) 30 milliLiter(s) PCA Continuous PCA Continuous  lactated ringers. 1000 milliLiter(s) IV Continuous <Continuous>  naloxone Injectable 0.1 milliGRAM(s) IV Push every 3 minutes PRN  ondansetron Injectable 4 milliGRAM(s) IV Push every 6 hours PRN  pantoprazole  Injectable 40 milliGRAM(s) IV Push daily    Medications: [PRN]  acetaminophen   Tablet .. 650 milliGRAM(s) Oral every 6 hours  cefoTEtan  IVPB 2 Gram(s) IV Intermittent every 12 hours  heparin   Injectable 5000 Unit(s) SubCutaneous every 8 hours  HYDROmorphone  Injectable 1 milliGRAM(s) IV Push once PRN  HYDROmorphone PCA (1 mG/mL) 30 milliLiter(s) PCA Continuous PCA Continuous  lactated ringers. 1000 milliLiter(s) IV Continuous <Continuous>  naloxone Injectable 0.1 milliGRAM(s) IV Push every 3 minutes PRN  ondansetron Injectable 4 milliGRAM(s) IV Push every 6 hours PRN  pantoprazole  Injectable 40 milliGRAM(s) IV Push daily      DVT PROPHYLAXIS: heparin   Injectable 5000 Unit(s) SubCutaneous every 8 hours    GI PROPHYLAXIS: pantoprazole  Injectable 40 milliGRAM(s) IV Push daily    ANTICOAGULATION:   ANTIBIOTICS:  cefoTEtan  IVPB 2 Gram(s)        Labs:                        12.4   18.40 )-----------( 397      ( 19 Aug 2021 16:50 )             40.6     08-19    138  |  104  |  9<L>  ----------------------------<  171<H>  4.5   |  23  |  0.8    Ca    8.9      19 Aug 2021 16:50  Phos  4.1     08-19  Mg     1.8     08-19      Imaging:  No post-op imaging studies    Assessment:    Patient is a 64 y/o female here for PAST. Patient reports HLD, colon polyps, ovarian mass who presents for evaluation of complicated sigmoid diverticulitis with colovesicular fistula. Patient originally presented to Dr. Flores with lower abdominal pain CT showed sigmoid diverticulitis with colovesical fistula as well as a left adnexal complex cystic lesion. She underwent colonoscopy on 3/5 that showed sigmoid diverticulosis and 3 TA. Dr. Gutierrez is planing for laparoscopic resection. Patient reports crampy lower abdominal pain and pneumaturia.   Plan:  ***  - Monitor vitals  - Monitor post-op labs and replete as necessary  - Monitor for bowel function  - Continue Pain Medications if necessary  - Continue Antibiotics if necessary  - Encourage ambulation as tolerated  - Monitor urine output and trial of void once Collins removed  - DVT and GI Prophylaxis  - Monitor wound and dressing for changes, redress as needed.      Date/Time: 08-19-21 @ 20:00

## 2021-08-19 NOTE — CHART NOTE - NSCHARTNOTEFT_GEN_A_CORE
PACU ANESTHESIA ADMISSION NOTE    Procedure: Hysterectomy, total, abdominal, with BSO      Post op diagnosis:  Diverticulitis        ____  Intubated  TV:______       Rate: ______      FiO2: ______  x____  Patent Airway  x____  Full return of protective reflexes  _x___  Full recovery from anesthesia / back to baseline     Vitals:       Sat:99                     BP:     160/90               R:          18  P: 74  T:     97.8        Mental Status:    x____ Awake    ____ Alert     ____ Drowsy    ____ Sedated    Nausea/Vomiting:   x____ NO    ____ Yes,   See Post - Op Orders          Pain Scale (0-10):    ____ Treatment:   x____ None      ____ See Post - Op/PCA Orders    Post - Operative Fluids:    x____ Oral     ____ See Post - Op Orders    Plan: Discharge:     ____Home         _x____Floor       _____Critical Care      _____  Other:_________________    Comments:

## 2021-08-19 NOTE — BRIEF OPERATIVE NOTE - OPERATION/FINDINGS
Robotic-assisted converted to open Exploratory laparotomy, multiple adhesions between small bowel, colon, bladder, uterus and left abdominal wall. Inflamed tissue noted on LLQ and pelvis area, fistula colovesical noted, low anterior resection of rectum and sigmoid performed, anastomosis with CEEA 28 mm, anastomotic donuts intact, leak test negative, flexible sigmoidoscopy performed. Bladder wall was reinforced with vicryl 2-0, No leak from bladder after administration of 300 cc of methylene blue. Repair of serosal tears of small bowel with interrupted suture. NAFISA w/ BSO performed by GYN. Yuval drain left in pelvis, abdominal wall closure by layers
Patient with severe pelvic inflammation secondary to diverticulitis  severe inflammation of bilateral tubes and ovaries and uterus

## 2021-08-19 NOTE — PATIENT PROFILE ADULT - DATE OF LAST VACCINATION
Physical Therapy  Visit Type: initial evaluation  Precautions:  Medical precautions:  fall risk; standard precautions.    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    Patient was wearing mask during hallway mobility portion of session only.   Lines:     Basic: telemetry, continuous pulse oximetry and capped IV      Lines in chart and on patient reviewed, cautions maintained throughout session.  Safety Measures: bed alarm and bed rails    SUBJECTIVE  Patient agreed to participate in therapy this date.    \"I'm really concerned about the neuropathy.\"  Patient / Family Goal: return to previous functional status, return home and maximize function    Pain   RN informed on pain level     OBJECTIVE   Level of consciousness: alert    Oriented to person, place, time and situation     Affect/Behavior: cooperative and pleasant  Functional Communication/Cognition    Overall status:  Within functional limits    Attention span:     Attention Span Impairment: distractibility  Range of Motion (measured in degrees unless otherwise noted, active unless indicated)  WFL: RLE, LLE  Strength (out of 5 unless otherwise indicated)   Comments / Details:  B hip flexion not formally tested in efforts to not have pt bearing down and increasing intra-abdominal pressure. B knee extension at least 4/5, B ankle DF at least 3+/5.  Balance    Sitting: Static: independent    Standing - Firm Surface - Eyes Open: Static: supervision double upper extremity support  Balance Details: WKatelyn CASON   Neurological Comments / Details: Baseline  B feet neuropathy, wears gym shoes at all times. Suggested to patient possibility of getting custom shoes for safety and support, I.e. diabetic shoes or with custom inserts via podiatrist?    Bed Mobility:      Rolling right: set up and with verbal cues (for ensuring BLE positioning due to pt's decr sensation in feet)      Supine to sit: supervision and with verbal cues (log roll technique, HOB flat)    Sit to supine: RN assisted pt onto transport cart with BLEs, log roll not performed.  Training completed:    Tasks: supine to sit and rolling right    Education details: patient safety, body mechanics and patient requires additional training    Educated on log roll technique to protect abdominal incision and avoid increased pain/pressure to surgical site.   Transfers:    Assistive devices: 2-wheeled walker    Sit to stand: with verbal cues (SBA)    Stand to sit: with verbal cues (SBA)  Training completed:    Tasks: sit to stand and stand to sit    Education details: body mechanics and patient safety    VCs for proper/safe hand placement       Gait/Ambulation:     Assistance: contact guard/touching/steadying assist   Assistive device: 2-wheeled walker    Distance (ft): 300    Pattern: step through    Type: decreased lazaro and unsteady    Deviation in foot placement: veers from path left and veers from path right    Stance phase: Left: decreased heel strike and decreased push off; Right: decreased heel strike and decreased push off  Training Completed:    Tasks: gait training on level surfaces    Education details: body mechanics, patient safety and patient requires additional training    Pt admits to furniture surfing in home, per RN report from ambulating in [place] earlier, pt using S.C. but also reaching out frequently for UE support. Provided RW for trial this session. Encouraged pt to maintain upright posture, ensure visual input into foot placement and stepping due to decreased sensation, and stop in place to  turn head or scan environment as needed. Pt frequently turning head and looking around hallway, causing her to veer R/L with RW and be slightly unsteady. No increased pain, dizziness, or SOB with activity. VSS.   Stair Mobility:    Training completed:      Not assessed this session as pt concerned re: her abdominal incision, typically uses UEs to pull up onto stairs, transportation present to transfer pt to medical floor.       Interventions     Training provided: transfer training, gait training, safety training, balance retraining, breathing/relaxation, body mechanics, positioning, activity tolerance, bed mobility training, stair training and compensatory techniques  Education provided re: role/benefits of PT in acute hospital setting, PT POC, goals, proper activity pacing & progression, general safety considerations, and encouraged cont mobilities with RN staff assist. Pt verbalized understanding. Reiterated role of PT in acute setting several times throughout session, also briefly discussed role of OT per pt's inquiry if this service is on consult.   Skilled input: Verbal instruction/cues, facilitation and posture correction  Verbal Consent: Writer verbally educated and received verbal consent for hand placement, positioning of patient, and techniques to be performed today from patient for clothing adjustments for techniques, hand placement and palpation for techniques and therapist position for techniques as described above and how they are pertinent to the patient's plan of care.       ASSESSMENT    Impairments: balance deficits, safety awareness and sensation  Functional Limitations: ambulation, stair climbing, bed mobility and sit to/from stand transfers     Discharge Recommendations   Recommendation for Discharge: PT IL: Patient requires 24 hour nonskilled assistance to perform mobility and/or ADLs safely, Patient needs nondaily skilled therapy after discharge (would benefit from outpatient PT with cancer  specialist for peripheral neuropathy, balance, strength, safety with functional mobility)        PT/OT Mobility Equipment for Discharge: possible RW     Therapy Diagnosis:  Other Abnormalities of Gait and Mobility  Skilled therapy is required to address these limitations in attempt to maximize the patient's independence.    Pain at end of session: RN informed on pain level 0/10  Predicted patient presentation: Low (stable) - Patient comorbidities and complexities, as defined above, will have little effect on progress for prescribed plan of care.    End of Session:   Location: on cart  Handoff to: nurse and transportation    PLAN    Suggestions for next session as indicated: PT Frequency: 3 days/week(1x/wk for 1st week, 3x/week starting 3.28)  Frequency Comments: 1/1; home; 3.27    A minimum of 8 minutes per session x 1 week.    Interventions: gait training, functional transfer training, safety education, balance, bed mobility, body mechanics, stairs retraining, compensatory technique education, HEP train/position and patient/family training  Agreement to plan and goals: patient agrees with goals and treatment plan        GOALS  Review Date: 3/27/2021  Long Term Goals: (to be met by time of discharge from hospital)  Sidelying to sit: Patient will complete bed mobility for sidelying to sit modified independent (via log roll).  Sit to sidelying: Patient will complete bed mobility for sit to sidelying modified independent (via log roll).  Sit to stand: Patient will complete sit to stand transfer with modified independent. Sit stand device: LRAD.    Stand to sit: Patient will complete stand to sit transfer with modified independent. Stand to sit device: LRAD.    Ambulation (even): Patient will ambulate on even surface for 300 feet with supervision. Even device: LRAD.      Category: stairs  Pt to demo ability to negotiate a full flight of stairs (12 steps), w/ 1 rail, S.C. as needed, assistance as needed, in order to  navigate through her home.     Documented in the chart in the following areas: Prior Level of Function. Pain. Assessment.      Therapy procedure time and total treatment time can be found documented on the Time Entry flowsheet   15-Apr-2021

## 2021-08-19 NOTE — BRIEF OPERATIVE NOTE - NSICDXBRIEFPROCEDURE_GEN_ALL_CORE_FT
PROCEDURES:  Laparoscopic low anterior resection of rectum and sigmoid colon with conversion to open procedure if indicated 19-Aug-2021 15:48:41  Jacob Sherwood  Flexible sigmoidoscopy 19-Aug-2021 15:49:00  Jacob Sherwood  Repair of serosal tear of small intestine 19-Aug-2021 15:49:34  Jacob Sherwood  Lysis of intestinal adhesions 19-Aug-2021 15:53:07  Jacob Sherwood  Lysis of adhesions of peritoneum 19-Aug-2021 15:53:17  Jacob Sherwood   PROCEDURES:  Laparoscopic low anterior resection of rectum and sigmoid colon with conversion to open procedure if indicated 19-Aug-2021 15:48:41  Jacob Sherwood  Flexible sigmoidoscopy 19-Aug-2021 15:49:00  Jacob Sherwood  Repair of serosal tear of small intestine 19-Aug-2021 15:49:34  Jacob Sherwood  Lysis of intestinal adhesions 19-Aug-2021 15:53:07  Jacob Sherwood  Lysis of adhesions of peritoneum 19-Aug-2021 15:53:17  Jacob Sherwood  Block, transversus abdominis plane, bilateral 19-Aug-2021 16:19:16  Jacob Sherwood

## 2021-08-20 LAB
ANION GAP SERPL CALC-SCNC: 13 MMOL/L — SIGNIFICANT CHANGE UP (ref 7–14)
BASOPHILS # BLD AUTO: 0.03 K/UL — SIGNIFICANT CHANGE UP (ref 0–0.2)
BASOPHILS NFR BLD AUTO: 0.2 % — SIGNIFICANT CHANGE UP (ref 0–1)
BUN SERPL-MCNC: 11 MG/DL — SIGNIFICANT CHANGE UP (ref 10–20)
CALCIUM SERPL-MCNC: 8.5 MG/DL — SIGNIFICANT CHANGE UP (ref 8.5–10.1)
CHLORIDE SERPL-SCNC: 105 MMOL/L — SIGNIFICANT CHANGE UP (ref 98–110)
CO2 SERPL-SCNC: 22 MMOL/L — SIGNIFICANT CHANGE UP (ref 17–32)
CREAT SERPL-MCNC: 0.8 MG/DL — SIGNIFICANT CHANGE UP (ref 0.7–1.5)
EOSINOPHIL # BLD AUTO: 0.03 K/UL — SIGNIFICANT CHANGE UP (ref 0–0.7)
EOSINOPHIL NFR BLD AUTO: 0.2 % — SIGNIFICANT CHANGE UP (ref 0–8)
GLUCOSE SERPL-MCNC: 115 MG/DL — HIGH (ref 70–99)
HCT VFR BLD CALC: 34.2 % — LOW (ref 37–47)
HGB BLD-MCNC: 10.4 G/DL — LOW (ref 12–16)
IMM GRANULOCYTES NFR BLD AUTO: 0.5 % — HIGH (ref 0.1–0.3)
LYMPHOCYTES # BLD AUTO: 13.1 % — LOW (ref 20.5–51.1)
LYMPHOCYTES # BLD AUTO: 2.04 K/UL — SIGNIFICANT CHANGE UP (ref 1.2–3.4)
MAGNESIUM SERPL-MCNC: 2.1 MG/DL — SIGNIFICANT CHANGE UP (ref 1.8–2.4)
MCHC RBC-ENTMCNC: 27.1 PG — SIGNIFICANT CHANGE UP (ref 27–31)
MCHC RBC-ENTMCNC: 30.4 G/DL — LOW (ref 32–37)
MCV RBC AUTO: 89.1 FL — SIGNIFICANT CHANGE UP (ref 81–99)
MONOCYTES # BLD AUTO: 1.6 K/UL — HIGH (ref 0.1–0.6)
MONOCYTES NFR BLD AUTO: 10.2 % — HIGH (ref 1.7–9.3)
NEUTROPHILS # BLD AUTO: 11.85 K/UL — HIGH (ref 1.4–6.5)
NEUTROPHILS NFR BLD AUTO: 75.8 % — HIGH (ref 42.2–75.2)
NRBC # BLD: 0 /100 WBCS — SIGNIFICANT CHANGE UP (ref 0–0)
PHOSPHATE SERPL-MCNC: 2.6 MG/DL — SIGNIFICANT CHANGE UP (ref 2.1–4.9)
PLATELET # BLD AUTO: 362 K/UL — SIGNIFICANT CHANGE UP (ref 130–400)
POTASSIUM SERPL-MCNC: 4.2 MMOL/L — SIGNIFICANT CHANGE UP (ref 3.5–5)
POTASSIUM SERPL-SCNC: 4.2 MMOL/L — SIGNIFICANT CHANGE UP (ref 3.5–5)
RBC # BLD: 3.84 M/UL — LOW (ref 4.2–5.4)
RBC # FLD: 16 % — HIGH (ref 11.5–14.5)
SODIUM SERPL-SCNC: 140 MMOL/L — SIGNIFICANT CHANGE UP (ref 135–146)
WBC # BLD: 15.63 K/UL — HIGH (ref 4.8–10.8)
WBC # FLD AUTO: 15.63 K/UL — HIGH (ref 4.8–10.8)

## 2021-08-20 RX ORDER — SODIUM CHLORIDE 9 MG/ML
1000 INJECTION, SOLUTION INTRAVENOUS
Refills: 0 | Status: DISCONTINUED | OUTPATIENT
Start: 2021-08-20 | End: 2021-08-20

## 2021-08-20 RX ORDER — SODIUM CHLORIDE 9 MG/ML
1000 INJECTION, SOLUTION INTRAVENOUS
Refills: 0 | Status: DISCONTINUED | OUTPATIENT
Start: 2021-08-20 | End: 2021-08-24

## 2021-08-20 RX ORDER — MAGNESIUM SULFATE 500 MG/ML
2 VIAL (ML) INJECTION ONCE
Refills: 0 | Status: COMPLETED | OUTPATIENT
Start: 2021-08-20 | End: 2021-08-20

## 2021-08-20 RX ORDER — ATORVASTATIN CALCIUM 80 MG/1
10 TABLET, FILM COATED ORAL AT BEDTIME
Refills: 0 | Status: DISCONTINUED | OUTPATIENT
Start: 2021-08-20 | End: 2021-08-25

## 2021-08-20 RX ORDER — ENOXAPARIN SODIUM 100 MG/ML
40 INJECTION SUBCUTANEOUS DAILY
Refills: 0 | Status: DISCONTINUED | OUTPATIENT
Start: 2021-08-20 | End: 2021-08-25

## 2021-08-20 RX ADMIN — SODIUM CHLORIDE 40 MILLILITER(S): 9 INJECTION, SOLUTION INTRAVENOUS at 08:50

## 2021-08-20 RX ADMIN — HEPARIN SODIUM 5000 UNIT(S): 5000 INJECTION INTRAVENOUS; SUBCUTANEOUS at 05:28

## 2021-08-20 RX ADMIN — Medication 650 MILLIGRAM(S): at 05:29

## 2021-08-20 RX ADMIN — Medication 650 MILLIGRAM(S): at 05:28

## 2021-08-20 RX ADMIN — Medication 100 GRAM(S): at 05:28

## 2021-08-20 RX ADMIN — ATORVASTATIN CALCIUM 10 MILLIGRAM(S): 80 TABLET, FILM COATED ORAL at 21:04

## 2021-08-20 RX ADMIN — PANTOPRAZOLE SODIUM 40 MILLIGRAM(S): 20 TABLET, DELAYED RELEASE ORAL at 11:05

## 2021-08-20 RX ADMIN — ENOXAPARIN SODIUM 40 MILLIGRAM(S): 100 INJECTION SUBCUTANEOUS at 11:05

## 2021-08-20 RX ADMIN — Medication 650 MILLIGRAM(S): at 17:02

## 2021-08-20 RX ADMIN — Medication 650 MILLIGRAM(S): at 23:17

## 2021-08-20 RX ADMIN — SODIUM CHLORIDE 125 MILLILITER(S): 9 INJECTION, SOLUTION INTRAVENOUS at 10:27

## 2021-08-20 RX ADMIN — Medication 650 MILLIGRAM(S): at 11:04

## 2021-08-20 RX ADMIN — Medication 50 GRAM(S): at 05:28

## 2021-08-20 RX ADMIN — Medication 650 MILLIGRAM(S): at 12:23

## 2021-08-20 RX ADMIN — Medication 650 MILLIGRAM(S): at 23:16

## 2021-08-20 NOTE — PHYSICAL THERAPY INITIAL EVALUATION ADULT - GAIT TRAINING, PT EVAL
Pt will amb 150'ft with RW and supervision by time of d/c. Pt will ascend/descend 4 steps with 1HR and CGA by time of d/c

## 2021-08-20 NOTE — PHYSICAL THERAPY INITIAL EVALUATION ADULT - DIAGNOSIS, PT EVAL
Rehab s/p robotic assisted sigmoidectomy converted to open with low anterior resection flex sigmoidoscopy

## 2021-08-20 NOTE — PROGRESS NOTE ADULT - NSPROGADDITIONALINFOA_GEN_ALL_CORE
66 year old patient , referred by Dr. 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 A recent CT scan ordered by GI Doctor (Dr. Espitia) noted  the [place] vesical fistula likely secondary to her diverticulitis.   An MRI was then performed confirming a large complex cystic lesion left adnexa 7.4 x 3.9 x 5.0cm. (Largely cystic but with solid components). Her  was negative.  The patient was taken to the operating room on 21 for a robotic assisted colon resection, repair of colovesical fistula along with excision of her left pelvic mass. She was seen preop and informed about the potential removal of any abnormal gyn organs.  We were called in by Dr. Mulligan to perform a NAFISA/BSO secondary to dense inflammatory disease involving the uterus and her adnexa. No suspicion for malignancy noted.  The  procedure was reviewed with the patient preoperatively. She was informed about potential complications of surgery including but not limited to bowel, bladder, and ureteral injuries. Infectious morbidity, along with bleeding and thromboembolic events.  She showed clear understanding, was given the opportunity to ask questions and was amenable to the above surgical treatment.  The patient was seen at bedside and appears to be recovering well.  She will be managed by the general surgery team postoperatively  and will be followed by Gyn as an outpatient.  I other almazan agree with the residents assessment.

## 2021-08-20 NOTE — PROGRESS NOTE ADULT - SUBJECTIVE AND OBJECTIVE BOX
GENERAL SURGERY PROGRESS NOTE    Patient: JEROME WILDER , 66y ([birth year])Female   MRN: [id number]  Location: [address]  Visit: 08-19-21 Inpatient  Date: 08-20-21 @ 09:21    Hospital Day #:2  Post-Op Day #:1    Procedure/Dx/Injuries: s/p robotic assisted sigmoidectomy converted to open with low anterior resection flex sigmoidoscopy Tap block NAFISA BSO POD#1    Events of past 24 hours:    PAST MEDICAL & SURGICAL HISTORY:  High cholesterol    Morbid obesity  BMI=40    History of surgery  back &amp; neck cyst removal, cholecystecomy,    S/P bilateral cataract extraction        Vitals:   T(F): 97.5 (08-20-21 @ 05:06), Max: 97.8 (08-20-21 @ 01:01)  HR: 100 (08-20-21 @ 05:06)  BP: 127/65 (08-20-21 @ 05:06)  RR: 18 (08-20-21 @ 05:06)  SpO2: 96% (08-20-21 @ 05:06)      Diet, NPO:   With Ice Chips/Sips of Water      Fluids: dextrose 5% + sodium chloride 0.45%.: Solution, 1000 milliLiter(s) infuse at 40 mL/Hr      I & O's:    08-19-21 @ 07:01  -  08-20-21 @ 07:00  --------------------------------------------------------  IN:    Lactated Ringers: 1500 mL  Total IN: 1500 mL    OUT:    Bulb (mL): 35 mL    Indwelling Catheter - Urethral (mL): 1500 mL  Total OUT: 1535 mL    Total NET: -35 mL        Bowel Movement: : [] YES [] NO  Flatus: : [] YES [] NO    PHYSICAL EXAM:  General: NAD  Cardiac: RRR S1, S2,  Respiratory: + air entry bilat  Abdomen: Soft mild tenderness on palp  Neuro: no focal deficits  Incision/wound: dressings  intact    MEDICATIONS  (STANDING):  acetaminophen   Tablet .. 650 milliGRAM(s) Oral every 6 hours  dextrose 5% + sodium chloride 0.45%. 1000 milliLiter(s) (40 mL/Hr) IV Continuous <Continuous>  enoxaparin Injectable 40 milliGRAM(s) SubCutaneous daily  HYDROmorphone PCA (1 mG/mL) 30 milliLiter(s) PCA Continuous PCA Continuous  pantoprazole  Injectable 40 milliGRAM(s) IV Push daily    MEDICATIONS  (PRN):  HYDROmorphone  Injectable 1 milliGRAM(s) IV Push once PRN Severe Pain (7 - 10)  naloxone Injectable 0.1 milliGRAM(s) IV Push every 3 minutes PRN For ANY of the following changes in patient status:  A. RR LESS THAN 10 breaths per minute, B. Oxygen saturation LESS THAN 90%, C. Sedation score of 6  ondansetron Injectable 4 milliGRAM(s) IV Push every 6 hours PRN Nausea      DVT PROPHYLAXIS: enoxaparin Injectable 40 milliGRAM(s) SubCutaneous daily    GI PROPHYLAXIS: pantoprazole  Injectable 40 milliGRAM(s) IV Push daily    ANTICOAGULATION:   ANTIBIOTICS:            LAB/STUDIES:  Labs:  CAPILLARY BLOOD GLUCOSE      POCT Blood Glucose.: 165 mg/dL (19 Aug 2021 11:06)                          12.4   18.40 )-----------( 397      ( 19 Aug 2021 16:50 )             40.6         08-19    138  |  104  |  9<L>  ----------------------------<  171<H>  4.5   |  23  |  0.8      Calcium, Total Serum: 8.9 mg/dL (08-19-21 @ 16:50)      LFTs:         Coags:      IMAGING:      ACCESS/ DEVICES:  [ ] Peripheral IV  [ ] Central Venous Line	[ ] R	[ ] L	[ ] IJ	[ ] Fem	[ ] SC	Placed:   [ ] Arterial Line		[ ] R	[ ] L	[ ] Fem	[ ] Rad	[ ] Ax	Placed:   [ ] PICC:					[ ] Mediport  [ ] Urinary Catheter,  Date Placed:   [ ] Chest tube: [ ] Right, [ ] Left  [ ] LOGAN/Yuval Drain

## 2021-08-20 NOTE — CONSULT NOTE ADULT - SUBJECTIVE AND OBJECTIVE BOX
HPI: A/P 65y/o female s/p s/p robotic assisted sigmoidectomy converted to open with low anterior resection flex sigmoidoscopy Tap block NAFISA BSO POD#1 for colonic fistula  Keep NPO with sips and chips/ ivf. Prior to hospitalization she was ambulating independent       PAST MEDICAL & SURGICAL HISTORY:  High cholesterol    Morbid obesity  BMI=40    History of surgery  back &amp; neck cyst removal, cholecystecomy,    S/P bilateral cataract extraction        Hospital Course:    TODAY'S SUBJECTIVE & REVIEW OF SYMPTOMS:     Constitutional WNL   Cardio WNL   Resp WNL   GI WNL  Heme WNL  Endo WNL  Skin WNL  MSK pain  Neuro WNL  Cognitive WNL  Psych WNL      MEDICATIONS  (STANDING):  acetaminophen   Tablet .. 650 milliGRAM(s) Oral every 6 hours  dextrose 5% + sodium chloride 0.9%. 1000 milliLiter(s) (125 mL/Hr) IV Continuous <Continuous>  enoxaparin Injectable 40 milliGRAM(s) SubCutaneous daily  HYDROmorphone PCA (1 mG/mL) 30 milliLiter(s) PCA Continuous PCA Continuous  pantoprazole  Injectable 40 milliGRAM(s) IV Push daily    MEDICATIONS  (PRN):  HYDROmorphone  Injectable 1 milliGRAM(s) IV Push once PRN Severe Pain (7 - 10)  naloxone Injectable 0.1 milliGRAM(s) IV Push every 3 minutes PRN For ANY of the following changes in patient status:  A. RR LESS THAN 10 breaths per minute, B. Oxygen saturation LESS THAN 90%, C. Sedation score of 6  ondansetron Injectable 4 milliGRAM(s) IV Push every 6 hours PRN Nausea      FAMILY HISTORY:  FH: Alzheimers disease (Father)    FH: rheumatoid arthritis (Mother)        Allergies    No Known Allergies    Intolerances        SOCIAL HISTORY:    [  ] Etoh  [  ] Smoking  [  ] Substance abuse     Home Environment:  [   ] Home Alone  [x   ] Lives with Family  [   ] Home Health Aid    Dwelling:  [   ] Apartment  [ x  ] Private House  [   ] Adult Home  [   ] Skilled Nursing Facility      [   ] Short Term  [   ] Long Term  [ x  ] Stairs       Elevator [   ]    FUNCTIONAL STATUS PTA: (Check all that apply)  Ambulation: [ x   ]Independent    [   ] Dependent     [   ] Non-Ambulatory  Assistive Device: [   ] SA Cane  [   ]  Q Cane  [   ] Walker  [   ]  Wheelchair  ADL : [ x  ] Independent  [    ]  Dependent       Vital Signs Last 24 Hrs  T(C): 36.3 (20 Aug 2021 13:02), Max: 36.6 (20 Aug 2021 01:01)  T(F): 97.3 (20 Aug 2021 13:02), Max: 97.8 (20 Aug 2021 01:01)  HR: 99 (20 Aug 2021 13:02) (83 - 100)  BP: 134/75 (20 Aug 2021 13:02) (104/62 - 183/84)  BP(mean): --  RR: 18 (20 Aug 2021 13:02) (14 - 23)  SpO2: 97% (20 Aug 2021 13:02) (96% - 100%)      PHYSICAL EXAM: Awake & Alert  GENERAL: NAD  HEAD:  Normocephalic  CHEST/LUNG: Clear   HEART: S1S2+  ABDOMEN: Soft, Nontender  EXTREMITIES:  no calf tenderness    NERVOUS SYSTEM:  Cranial Nerves 2-12 intact [   ] Abnormal  [   ]  ROM: WFL all extremities [ x  ]  Abnormal [   ]  Motor Strength: WFL all extremities  [ x  ]  Abnormal [   ]  Sensation: intact to light touch [ x  ] Abnormal [   ]    FUNCTIONAL STATUS:  Bed Mobility: Independent [   ]  Supervision [   ]  Needs Assistance [ x  ]  N/A [   ]  Transfers: Independent [   ]  Supervision [   ]  Needs Assistance [x   ]  N/A [   ]   Ambulation: Independent [   ]  Supervision [   ]  Needs Assistance [   ]  N/A [   ]  ADL: Independent [   ] Requires Assistance [   ] N/A [   ]      LABS:                        12.4   18.40 )-----------( 397      ( 19 Aug 2021 16:50 )             40.6     08-19    138  |  104  |  9<L>  ----------------------------<  171<H>  4.5   |  23  |  0.8    Ca    8.9      19 Aug 2021 16:50  Phos  4.1     08-19  Mg     1.8     08-19            RADIOLOGY & ADDITIONAL STUDIES:

## 2021-08-20 NOTE — PROGRESS NOTE ADULT - ASSESSMENT
A/P 67y/o female s/p s/p robotic assisted sigmoidectomy converted to open with low anterior resection flex sigmoidoscopy Tap block NAFISA BSO POD#1 for colonic fistula  Keep NPO with sips and chips/ ivf  keep mejia  PT/rehab   GI and DVT prophylaxis  f/u labs  encourage incentive spirometry use   continue close monitoring

## 2021-08-20 NOTE — PHYSICAL THERAPY INITIAL EVALUATION ADULT - DID THE PATIENT HAVE SURGERY?
s/p robotic assisted sigmoidectomy converted to open with low anterior resection flex sigmoidoscopy/yes

## 2021-08-20 NOTE — PROGRESS NOTE ADULT - ASSESSMENT
67 yo w/ diverticulitis and colonic fistula s/p LAR by general surgery, intraop consult for NAFISA/BSO, POD1, recovering well.    Discussed with patient that uterus, tubes and ovaries were removed due to significant inflammation and adhesions. Patient expressed understanding.    - Nothing in the vagina for 8 weeks (no sex, no tampons, no douching). Avoid tub baths, patient may shower.  - follow up in office with Dr. Gutierrez after discharge   - management per general surgery    Dr. Gutierrez to be made aware.

## 2021-08-20 NOTE — PHYSICAL THERAPY INITIAL EVALUATION ADULT - GENERAL OBSERVATIONS, REHAB EVAL
14:00-14:30. Pt encountered seated in b/s chair in NAD. +Collins +PCA Pump. Pt agreeable to PT IE. PT IE Complete. Pt educated on seated/supine therex. Pt educated to continue ambulation with NS staff. PT to f/u

## 2021-08-20 NOTE — PROGRESS NOTE ADULT - SUBJECTIVE AND OBJECTIVE BOX
PGY 4 Progress Note    Subjective: Patient seen and examined at bedside. Doing well, pain controlled with meds, no significant vaginal bleeding. Denies fever, chills, CP, SOB, N/V, LE pain. She is not yet OOB, mejia in, no flatus or BM, NPO.    Physical exam:    Vital Signs Last 24 Hrs  T(F): 97.5 (20 Aug 2021 05:06), Max: 97.8 (20 Aug 2021 01:01)  HR: 100 (20 Aug 2021 05:06) (83 - 100)  BP: 127/65 (20 Aug 2021 05:06) (114/65 - 183/84)  RR: 18 (20 Aug 2021 05:06) (14 - 23)  SpO2: 96% (20 Aug 2021 05:06) (96% - 100%)    Gen: NAD  CVS: s1s2, rrr  Lungs: ctab, no r/r/w  Abdomen: Soft, appropriately tender, moderate distension, no r/rg  Incisions: multiple laparoscopic incisions site and vertical skin incision with tegaderms in place, c/d/i  Pelvic: deferred, scant dark blood on pad  Ext: No calf tenderness b/l LE, SCDs    Diet: NPO per surgery  meds:    acetaminophen   Tablet ..   1000 milliGRAM(s) Oral (08-19-21 @ 06:45)    acetaminophen   Tablet ..   650 milliGRAM(s) Oral (08-20-21 @ 05:28)   650 milliGRAM(s) Oral (08-19-21 @ 23:26)    cefoTEtan  IVPB   100 mL/Hr IV Intermittent (08-20-21 @ 05:28)   100 mL/Hr IV Intermittent (08-19-21 @ 21:18)    gabapentin   300 milliGRAM(s) Oral (08-19-21 @ 07:00)    heparin   Injectable   5000 Unit(s) SubCutaneous (08-19-21 @ 06:41)    heparin   Injectable   5000 Unit(s) SubCutaneous (08-20-21 @ 05:28)   5000 Unit(s) SubCutaneous (08-19-21 @ 21:18)    magnesium sulfate  IVPB   50 mL/Hr IV Intermittent (08-20-21 @ 05:28)        LABS:                        12.4   18.40 )-----------( 397      ( 19 Aug 2021 16:50 )             40.6     Magnesium, Serum: 1.8 mg/dL (08-19 @ 16:50)    08-19-21 @ 16:50      138  |  104  |  9<L>  ----------------------------<  171<H>  4.5   |  23  |  0.8        Ca    8.9      19 Aug 2021 16:50  Phos  4.1     08-19  Mg     1.8     08-19

## 2021-08-20 NOTE — PHYSICAL THERAPY INITIAL EVALUATION ADULT - PERTINENT HX OF CURRENT PROBLEM, REHAB EVAL
67y/o female s/p s/p robotic assisted sigmoidectomy converted to open with low anterior resection flex sigmoidoscopy Tap block NAFISA BSO POD#1 for colonic fistula

## 2021-08-21 LAB
ANION GAP SERPL CALC-SCNC: 9 MMOL/L — SIGNIFICANT CHANGE UP (ref 7–14)
BASOPHILS # BLD AUTO: 0.04 K/UL — SIGNIFICANT CHANGE UP (ref 0–0.2)
BASOPHILS NFR BLD AUTO: 0.3 % — SIGNIFICANT CHANGE UP (ref 0–1)
BUN SERPL-MCNC: 8 MG/DL — LOW (ref 10–20)
CALCIUM SERPL-MCNC: 8.6 MG/DL — SIGNIFICANT CHANGE UP (ref 8.5–10.1)
CHLORIDE SERPL-SCNC: 103 MMOL/L — SIGNIFICANT CHANGE UP (ref 98–110)
CO2 SERPL-SCNC: 26 MMOL/L — SIGNIFICANT CHANGE UP (ref 17–32)
COVID-19 SPIKE DOMAIN AB INTERP: POSITIVE
COVID-19 SPIKE DOMAIN ANTIBODY RESULT: 182 U/ML — HIGH
CREAT SERPL-MCNC: 0.7 MG/DL — SIGNIFICANT CHANGE UP (ref 0.7–1.5)
EOSINOPHIL # BLD AUTO: 0.1 K/UL — SIGNIFICANT CHANGE UP (ref 0–0.7)
EOSINOPHIL NFR BLD AUTO: 0.8 % — SIGNIFICANT CHANGE UP (ref 0–8)
GLUCOSE SERPL-MCNC: 94 MG/DL — SIGNIFICANT CHANGE UP (ref 70–99)
HCT VFR BLD CALC: 32.9 % — LOW (ref 37–47)
HGB BLD-MCNC: 10.3 G/DL — LOW (ref 12–16)
IMM GRANULOCYTES NFR BLD AUTO: 0.4 % — HIGH (ref 0.1–0.3)
LYMPHOCYTES # BLD AUTO: 1.66 K/UL — SIGNIFICANT CHANGE UP (ref 1.2–3.4)
LYMPHOCYTES # BLD AUTO: 13.6 % — LOW (ref 20.5–51.1)
MAGNESIUM SERPL-MCNC: 2 MG/DL — SIGNIFICANT CHANGE UP (ref 1.8–2.4)
MCHC RBC-ENTMCNC: 27.6 PG — SIGNIFICANT CHANGE UP (ref 27–31)
MCHC RBC-ENTMCNC: 31.3 G/DL — LOW (ref 32–37)
MCV RBC AUTO: 88.2 FL — SIGNIFICANT CHANGE UP (ref 81–99)
MONOCYTES # BLD AUTO: 1.06 K/UL — HIGH (ref 0.1–0.6)
MONOCYTES NFR BLD AUTO: 8.7 % — SIGNIFICANT CHANGE UP (ref 1.7–9.3)
NEUTROPHILS # BLD AUTO: 9.3 K/UL — HIGH (ref 1.4–6.5)
NEUTROPHILS NFR BLD AUTO: 76.2 % — HIGH (ref 42.2–75.2)
NRBC # BLD: 0 /100 WBCS — SIGNIFICANT CHANGE UP (ref 0–0)
PHOSPHATE SERPL-MCNC: 2.8 MG/DL — SIGNIFICANT CHANGE UP (ref 2.1–4.9)
PLATELET # BLD AUTO: 331 K/UL — SIGNIFICANT CHANGE UP (ref 130–400)
POTASSIUM SERPL-MCNC: 4.2 MMOL/L — SIGNIFICANT CHANGE UP (ref 3.5–5)
POTASSIUM SERPL-SCNC: 4.2 MMOL/L — SIGNIFICANT CHANGE UP (ref 3.5–5)
RBC # BLD: 3.73 M/UL — LOW (ref 4.2–5.4)
RBC # FLD: 16.1 % — HIGH (ref 11.5–14.5)
SARS-COV-2 IGG+IGM SERPL QL IA: 182 U/ML — HIGH
SARS-COV-2 IGG+IGM SERPL QL IA: POSITIVE
SODIUM SERPL-SCNC: 138 MMOL/L — SIGNIFICANT CHANGE UP (ref 135–146)
WBC # BLD: 12.21 K/UL — HIGH (ref 4.8–10.8)
WBC # FLD AUTO: 12.21 K/UL — HIGH (ref 4.8–10.8)

## 2021-08-21 RX ORDER — OXYCODONE HYDROCHLORIDE 5 MG/1
5 TABLET ORAL EVERY 4 HOURS
Refills: 0 | Status: DISCONTINUED | OUTPATIENT
Start: 2021-08-21 | End: 2021-08-25

## 2021-08-21 RX ORDER — SODIUM,POTASSIUM PHOSPHATES 278-250MG
1 POWDER IN PACKET (EA) ORAL ONCE
Refills: 0 | Status: COMPLETED | OUTPATIENT
Start: 2021-08-21 | End: 2021-08-22

## 2021-08-21 RX ADMIN — Medication 62.5 MILLIMOLE(S): at 05:04

## 2021-08-21 RX ADMIN — Medication 650 MILLIGRAM(S): at 05:03

## 2021-08-21 RX ADMIN — ENOXAPARIN SODIUM 40 MILLIGRAM(S): 100 INJECTION SUBCUTANEOUS at 11:28

## 2021-08-21 RX ADMIN — Medication 650 MILLIGRAM(S): at 12:02

## 2021-08-21 RX ADMIN — PANTOPRAZOLE SODIUM 40 MILLIGRAM(S): 20 TABLET, DELAYED RELEASE ORAL at 11:28

## 2021-08-21 RX ADMIN — Medication 650 MILLIGRAM(S): at 05:04

## 2021-08-21 RX ADMIN — ATORVASTATIN CALCIUM 10 MILLIGRAM(S): 80 TABLET, FILM COATED ORAL at 21:30

## 2021-08-21 RX ADMIN — Medication 650 MILLIGRAM(S): at 11:30

## 2021-08-21 NOTE — PROGRESS NOTE ADULT - ASSESSMENT
ASSESSMENT:   66F POD2 s/p robotic converted to open low anterior resection, NAFISA BSO for sigmoid diverticulitis with colovesicle fistula.       PLAN:  - Advance to [facility] today  - Collins for 2x weeks  - PT recs SNF vs Home w/ Outpatient/VN  - Monitor vitals  - Monitor labs and replete as necessary  - Monitor for bowel function  - Continue Pain Medications if necessary  - Continue Antibiotics if necessary  - Encourage ambulation as tolerated  - Monitor urine output- DVT and GI Prophylaxis      BLUE TEAM SPECTRA [id number]

## 2021-08-21 NOTE — PROGRESS NOTE ADULT - SUBJECTIVE AND OBJECTIVE BOX
GENERAL SURGERY PROGRESS NOTE    Patient: JEROME WILDER , 66y ([birth year])Female   MRN: [id number]  Location: [address]  Visit: 08-19-21 Inpatient  Date: 08-21-21 @ 04:00        Admitted :08-19-21 (2d)  LOS: 2d    Procedure/Dx/Injuries:    Events of past 24 hours: Patient seen and examined at bedside. No acute events overnight. Afebrile, VSS. No gas, no BM.  Phos repleted.    PAST MEDICAL & SURGICAL HISTORY:  High cholesterol    Morbid obesity  BMI=40    History of surgery  back &amp; neck cyst removal, cholecystecomy,    S/P bilateral cataract extraction        Vitals:   T(F): 97 (08-21-21 @ 00:38), Max: 97.6 (08-20-21 @ 17:05)  HR: 100 (08-21-21 @ 00:38)  BP: 122/64 (08-21-21 @ 00:38)  RR: 18 (08-21-21 @ 00:38)  SpO2: 96% (08-21-21 @ 00:38)      Diet, NPO:   With Ice Chips/Sips of Water      Fluids:     I & O's:    08-19-21 @ 07:01  -  08-20-21 @ 07:00  --------------------------------------------------------  IN:    Lactated Ringers: 1500 mL  Total IN: 1500 mL    OUT:    Bulb (mL): 35 mL    Indwelling Catheter - Urethral (mL): 1500 mL  Total OUT: 1535 mL    Total NET: -35 mL      PHYSICAL EXAM:  General Appearance: NAD  HEENT: EOMI, sclera non-icteric.  Heart: RR  Lungs: No increased work of breathing or accessory muscle use. Symmetric chest wall rise and fall.   Abdomen:  Soft, nontender, nondistended.   MSK/Extremities: Warm & well-perfused.   Skin: Warm, dry. No jaundice.   Incision/wound: healing well, dressings in place, clean, dry and intact    MEDICATIONS  (STANDING):  acetaminophen   Tablet .. 650 milliGRAM(s) Oral every 6 hours  atorvastatin 10 milliGRAM(s) Oral at bedtime  dextrose 5% + sodium chloride 0.9%. 1000 milliLiter(s) (40 mL/Hr) IV Continuous <Continuous>  enoxaparin Injectable 40 milliGRAM(s) SubCutaneous daily  HYDROmorphone PCA (1 mG/mL) 30 milliLiter(s) PCA Continuous PCA Continuous  pantoprazole  Injectable 40 milliGRAM(s) IV Push daily  sodium phosphate IVPB 15 milliMole(s) IV Intermittent once    MEDICATIONS  (PRN):  HYDROmorphone  Injectable 1 milliGRAM(s) IV Push once PRN Severe Pain (7 - 10)  naloxone Injectable 0.1 milliGRAM(s) IV Push every 3 minutes PRN For ANY of the following changes in patient status:  A. RR LESS THAN 10 breaths per minute, B. Oxygen saturation LESS THAN 90%, C. Sedation score of 6  ondansetron Injectable 4 milliGRAM(s) IV Push every 6 hours PRN Nausea      DVT PROPHYLAXIS: enoxaparin Injectable 40 milliGRAM(s) SubCutaneous daily    GI PROPHYLAXIS: pantoprazole  Injectable 40 milliGRAM(s) IV Push daily      LAB/STUDIES:  Labs:  CAPILLARY BLOOD GLUCOSE                              10.4   15.63 )-----------( 362      ( 20 Aug 2021 20:59 )             34.2       Auto Neutrophil %: 75.8 % (08-20-21 @ 20:59)  Auto Immature Granulocyte %: 0.5 % (08-20-21 @ 20:59)    08-20    140  |  105  |  11  ----------------------------<  115<H>  4.2   |  22  |  0.8      Calcium, Total Serum: 8.5 mg/dL (08-20-21 @ 20:59)        Culture - Surgical Swab (collected 19 Aug 2021 10:40)  Source: .Surgical Swab None  Preliminary Report (20 Aug 2021 21:00):    No growth

## 2021-08-22 RX ADMIN — Medication 1 PACKET(S): at 05:16

## 2021-08-22 RX ADMIN — PANTOPRAZOLE SODIUM 40 MILLIGRAM(S): 20 TABLET, DELAYED RELEASE ORAL at 11:23

## 2021-08-22 RX ADMIN — Medication 650 MILLIGRAM(S): at 17:28

## 2021-08-22 RX ADMIN — SODIUM CHLORIDE 40 MILLILITER(S): 9 INJECTION, SOLUTION INTRAVENOUS at 15:09

## 2021-08-22 RX ADMIN — ENOXAPARIN SODIUM 40 MILLIGRAM(S): 100 INJECTION SUBCUTANEOUS at 11:24

## 2021-08-22 RX ADMIN — Medication 650 MILLIGRAM(S): at 12:00

## 2021-08-22 RX ADMIN — Medication 650 MILLIGRAM(S): at 17:52

## 2021-08-22 RX ADMIN — Medication 650 MILLIGRAM(S): at 05:17

## 2021-08-22 RX ADMIN — Medication 650 MILLIGRAM(S): at 11:23

## 2021-08-22 RX ADMIN — ATORVASTATIN CALCIUM 10 MILLIGRAM(S): 80 TABLET, FILM COATED ORAL at 21:06

## 2021-08-22 NOTE — PROGRESS NOTE ADULT - ASSESSMENT
66F POD3 s/p robotic converted to open low anterior resection, NAFISA BSO for sigmoid diverticulitis with colovesicle fistula. WBC down, hgb stable, tolerating CLD      PLAN:  - Advance to low fiber diet today  - Collins for 2x weeks  - PT recs SNF vs Home w/ Outpatient/VN  - Monitor vitals  - Monitor labs and replete as necessary  - Monitor for bowel function  - Continue Pain Medications if necessary  - Continue Antibiotics if necessary  - Encourage ambulation as tolerated  - Monitor urine output- DVT and GI Prophylaxis      BLUE TEAM SPECTRA [id number]

## 2021-08-22 NOTE — PROGRESS NOTE ADULT - SUBJECTIVE AND OBJECTIVE BOX
GENERAL SURGERY PROGRESS NOTE      Visit: 08-19-21 Inpatient      Admitted :08-19-21     Procedure/Dx/Injuries: s/p robotic assisted sigmoidectomy converted to open with LAR, NAFISA-BSO    Events of past 24 hours: Patient seen and examined at bedside. No acute events overnight. Afebrile, VSS. No gas, no BM.  Phos repleted.       Vitals:   T(F): 97.6 (08-21-21 @ 21:45), Max: 98.1 (08-21-21 @ 08:44)  HR: 97 (08-21-21 @ 21:45)  BP: 133/64 (08-21-21 @ 21:45)  RR: 18 (08-21-21 @ 21:45)  SpO2: 97% (08-21-21 @ 21:45)      Diet, Clear Liquid      Fluids:     I & O's:    08-20-21 @ 07:01  -  08-21-21 @ 07:00  --------------------------------------------------------  IN:    dextrose 5% + sodium chloride 0.9%: 560 mL    dextrose 5% + sodium chloride 0.9%: 1000 mL  Total IN: 1560 mL    OUT:    Bulb (mL): 135 mL    Indwelling Catheter - Urethral (mL): 1700 mL  Total OUT: 1835 mL    Total NET: -275 mL        Bowel Movement: : [] YES [x] NO  Flatus: : [] YES [x] NO    PHYSICAL EXAM:  General Appearance: NAD  HEENT: EOMI, sclera non-icteric.  Heart: RRR   Lungs: No increased work of breathing or accessory muscle use. Symmetric chest wall rise and fall.   Abdomen:  Soft, nontender, nondistended.   MSK/Extremities: Warm & well-perfused.   Skin: Warm, dry. No jaundice.   Incision/wound: healing well, dressings in place, clean, dry and intact    MEDICATIONS  (STANDING):  acetaminophen   Tablet .. 650 milliGRAM(s) Oral every 6 hours  atorvastatin 10 milliGRAM(s) Oral at bedtime  dextrose 5% + sodium chloride 0.9%. 1000 milliLiter(s) (40 mL/Hr) IV Continuous <Continuous>  enoxaparin Injectable 40 milliGRAM(s) SubCutaneous daily  pantoprazole  Injectable 40 milliGRAM(s) IV Push daily  potassium phosphate / sodium phosphate Powder (PHOS-NaK) 1 Packet(s) Oral once    MEDICATIONS  (PRN):  ondansetron Injectable 4 milliGRAM(s) IV Push every 6 hours PRN Nausea  oxyCODONE    IR 5 milliGRAM(s) Oral every 4 hours PRN Severe Pain (7 - 10)      DVT PROPHYLAXIS: enoxaparin Injectable 40 milliGRAM(s) SubCutaneous daily    GI PROPHYLAXIS: pantoprazole  Injectable 40 milliGRAM(s) IV Push daily      LAB/STUDIES:                          10.3   12.21 )-----------( 331      ( 21 Aug 2021 21:03 )             32.9       Auto Immature Granulocyte %: 0.4 % (08-21-21 @ 21:03)  Auto Neutrophil %: 76.2 % (08-21-21 @ 21:03)    08-21    138  |  103  |  8<L>  ----------------------------<  94  4.2   |  26  |  0.7      Calcium, Total Serum: 8.6 mg/dL (08-21-21 @ 21:03)        Culture - Surgical Swab (collected 19 Aug 2021 10:40)  Source: .Surgical Swab None  Preliminary Report (20 Aug 2021 21:00):    No growth      ACCESS/ DEVICES:  [ x] Peripheral IV  [ ] Central Venous Line	[ ] R	[ ] L	[ ] IJ	[ ] Fem	[ ] SC	Placed:   [ ] Arterial Line		[ ] R	[ ] L	[ ] Fem	[ ] Rad	[ ] Ax	Placed:   [ ] PICC:					[ ] Mediport  [ ] Urinary Catheter,  Date Placed:   [ ] Chest tube: [ ] Right, [ ] Left  [ ] LOGAN/Yuval Drains  [ ] Wound vac      BLUE TEAM SPECTRA #[id number]

## 2021-08-23 LAB
ANION GAP SERPL CALC-SCNC: 10 MMOL/L — SIGNIFICANT CHANGE UP (ref 7–14)
ANION GAP SERPL CALC-SCNC: 12 MMOL/L — SIGNIFICANT CHANGE UP (ref 7–14)
BASOPHILS # BLD AUTO: 0.03 K/UL — SIGNIFICANT CHANGE UP (ref 0–0.2)
BASOPHILS # BLD AUTO: 0.04 K/UL — SIGNIFICANT CHANGE UP (ref 0–0.2)
BASOPHILS NFR BLD AUTO: 0.3 % — SIGNIFICANT CHANGE UP (ref 0–1)
BASOPHILS NFR BLD AUTO: 0.4 % — SIGNIFICANT CHANGE UP (ref 0–1)
BUN SERPL-MCNC: 6 MG/DL — LOW (ref 10–20)
BUN SERPL-MCNC: 7 MG/DL — LOW (ref 10–20)
CALCIUM SERPL-MCNC: 8.7 MG/DL — SIGNIFICANT CHANGE UP (ref 8.5–10.1)
CALCIUM SERPL-MCNC: 8.8 MG/DL — SIGNIFICANT CHANGE UP (ref 8.5–10.1)
CHLORIDE SERPL-SCNC: 102 MMOL/L — SIGNIFICANT CHANGE UP (ref 98–110)
CHLORIDE SERPL-SCNC: 102 MMOL/L — SIGNIFICANT CHANGE UP (ref 98–110)
CO2 SERPL-SCNC: 22 MMOL/L — SIGNIFICANT CHANGE UP (ref 17–32)
CO2 SERPL-SCNC: 26 MMOL/L — SIGNIFICANT CHANGE UP (ref 17–32)
CREAT SERPL-MCNC: 0.6 MG/DL — LOW (ref 0.7–1.5)
CREAT SERPL-MCNC: 0.6 MG/DL — LOW (ref 0.7–1.5)
EOSINOPHIL # BLD AUTO: 0.28 K/UL — SIGNIFICANT CHANGE UP (ref 0–0.7)
EOSINOPHIL # BLD AUTO: 0.33 K/UL — SIGNIFICANT CHANGE UP (ref 0–0.7)
EOSINOPHIL NFR BLD AUTO: 3.1 % — SIGNIFICANT CHANGE UP (ref 0–8)
EOSINOPHIL NFR BLD AUTO: 3.6 % — SIGNIFICANT CHANGE UP (ref 0–8)
GLUCOSE SERPL-MCNC: 104 MG/DL — HIGH (ref 70–99)
GLUCOSE SERPL-MCNC: 96 MG/DL — SIGNIFICANT CHANGE UP (ref 70–99)
HCT VFR BLD CALC: 32.6 % — LOW (ref 37–47)
HCT VFR BLD CALC: 35 % — LOW (ref 37–47)
HGB BLD-MCNC: 10 G/DL — LOW (ref 12–16)
HGB BLD-MCNC: 11 G/DL — LOW (ref 12–16)
IMM GRANULOCYTES NFR BLD AUTO: 0.7 % — HIGH (ref 0.1–0.3)
IMM GRANULOCYTES NFR BLD AUTO: 1 % — HIGH (ref 0.1–0.3)
LYMPHOCYTES # BLD AUTO: 2.03 K/UL — SIGNIFICANT CHANGE UP (ref 1.2–3.4)
LYMPHOCYTES # BLD AUTO: 2.14 K/UL — SIGNIFICANT CHANGE UP (ref 1.2–3.4)
LYMPHOCYTES # BLD AUTO: 22.4 % — SIGNIFICANT CHANGE UP (ref 20.5–51.1)
LYMPHOCYTES # BLD AUTO: 23.4 % — SIGNIFICANT CHANGE UP (ref 20.5–51.1)
MAGNESIUM SERPL-MCNC: 1.9 MG/DL — SIGNIFICANT CHANGE UP (ref 1.8–2.4)
MAGNESIUM SERPL-MCNC: 2.1 MG/DL — SIGNIFICANT CHANGE UP (ref 1.8–2.4)
MCHC RBC-ENTMCNC: 27 PG — SIGNIFICANT CHANGE UP (ref 27–31)
MCHC RBC-ENTMCNC: 27.7 PG — SIGNIFICANT CHANGE UP (ref 27–31)
MCHC RBC-ENTMCNC: 30.7 G/DL — LOW (ref 32–37)
MCHC RBC-ENTMCNC: 31.4 G/DL — LOW (ref 32–37)
MCV RBC AUTO: 87.9 FL — SIGNIFICANT CHANGE UP (ref 81–99)
MCV RBC AUTO: 88.2 FL — SIGNIFICANT CHANGE UP (ref 81–99)
MONOCYTES # BLD AUTO: 0.9 K/UL — HIGH (ref 0.1–0.6)
MONOCYTES # BLD AUTO: 1 K/UL — HIGH (ref 0.1–0.6)
MONOCYTES NFR BLD AUTO: 11 % — HIGH (ref 1.7–9.3)
MONOCYTES NFR BLD AUTO: 9.8 % — HIGH (ref 1.7–9.3)
NEUTROPHILS # BLD AUTO: 5.64 K/UL — SIGNIFICANT CHANGE UP (ref 1.4–6.5)
NEUTROPHILS # BLD AUTO: 5.66 K/UL — SIGNIFICANT CHANGE UP (ref 1.4–6.5)
NEUTROPHILS NFR BLD AUTO: 61.9 % — SIGNIFICANT CHANGE UP (ref 42.2–75.2)
NEUTROPHILS NFR BLD AUTO: 62.4 % — SIGNIFICANT CHANGE UP (ref 42.2–75.2)
NRBC # BLD: 0 /100 WBCS — SIGNIFICANT CHANGE UP (ref 0–0)
NRBC # BLD: 0 /100 WBCS — SIGNIFICANT CHANGE UP (ref 0–0)
PHOSPHATE SERPL-MCNC: 3.1 MG/DL — SIGNIFICANT CHANGE UP (ref 2.1–4.9)
PHOSPHATE SERPL-MCNC: 3.5 MG/DL — SIGNIFICANT CHANGE UP (ref 2.1–4.9)
PLATELET # BLD AUTO: 343 K/UL — SIGNIFICANT CHANGE UP (ref 130–400)
PLATELET # BLD AUTO: 407 K/UL — HIGH (ref 130–400)
POTASSIUM SERPL-MCNC: 4 MMOL/L — SIGNIFICANT CHANGE UP (ref 3.5–5)
POTASSIUM SERPL-MCNC: 4.2 MMOL/L — SIGNIFICANT CHANGE UP (ref 3.5–5)
POTASSIUM SERPL-SCNC: 4 MMOL/L — SIGNIFICANT CHANGE UP (ref 3.5–5)
POTASSIUM SERPL-SCNC: 4.2 MMOL/L — SIGNIFICANT CHANGE UP (ref 3.5–5)
RBC # BLD: 3.71 M/UL — LOW (ref 4.2–5.4)
RBC # BLD: 3.97 M/UL — LOW (ref 4.2–5.4)
RBC # FLD: 15.9 % — HIGH (ref 11.5–14.5)
RBC # FLD: 16 % — HIGH (ref 11.5–14.5)
SODIUM SERPL-SCNC: 136 MMOL/L — SIGNIFICANT CHANGE UP (ref 135–146)
SODIUM SERPL-SCNC: 138 MMOL/L — SIGNIFICANT CHANGE UP (ref 135–146)
WBC # BLD: 9.05 K/UL — SIGNIFICANT CHANGE UP (ref 4.8–10.8)
WBC # BLD: 9.15 K/UL — SIGNIFICANT CHANGE UP (ref 4.8–10.8)
WBC # FLD AUTO: 9.05 K/UL — SIGNIFICANT CHANGE UP (ref 4.8–10.8)
WBC # FLD AUTO: 9.15 K/UL — SIGNIFICANT CHANGE UP (ref 4.8–10.8)

## 2021-08-23 RX ORDER — MAGNESIUM SULFATE 500 MG/ML
2 VIAL (ML) INJECTION ONCE
Refills: 0 | Status: COMPLETED | OUTPATIENT
Start: 2021-08-23 | End: 2021-08-23

## 2021-08-23 RX ORDER — PANTOPRAZOLE SODIUM 20 MG/1
40 TABLET, DELAYED RELEASE ORAL
Refills: 0 | Status: DISCONTINUED | OUTPATIENT
Start: 2021-08-23 | End: 2021-08-25

## 2021-08-23 RX ADMIN — Medication 650 MILLIGRAM(S): at 01:15

## 2021-08-23 RX ADMIN — Medication 650 MILLIGRAM(S): at 23:12

## 2021-08-23 RX ADMIN — Medication 650 MILLIGRAM(S): at 11:12

## 2021-08-23 RX ADMIN — Medication 650 MILLIGRAM(S): at 17:17

## 2021-08-23 RX ADMIN — ATORVASTATIN CALCIUM 10 MILLIGRAM(S): 80 TABLET, FILM COATED ORAL at 21:22

## 2021-08-23 RX ADMIN — PANTOPRAZOLE SODIUM 40 MILLIGRAM(S): 20 TABLET, DELAYED RELEASE ORAL at 11:12

## 2021-08-23 RX ADMIN — Medication 650 MILLIGRAM(S): at 05:17

## 2021-08-23 RX ADMIN — Medication 50 GRAM(S): at 12:36

## 2021-08-23 RX ADMIN — ENOXAPARIN SODIUM 40 MILLIGRAM(S): 100 INJECTION SUBCUTANEOUS at 11:12

## 2021-08-23 NOTE — DIETITIAN INITIAL EVALUATION ADULT. - PERTINENT MEDS FT
MEDICATIONS  (STANDING):  acetaminophen   Tablet .. 650 milliGRAM(s) Oral every 6 hours  atorvastatin 10 milliGRAM(s) Oral at bedtime  dextrose 5% + sodium chloride 0.9%. 1000 milliLiter(s) (40 mL/Hr) IV Continuous <Continuous>  pantoprazole  Injectable 40 milliGRAM(s) IV Push daily    MEDICATIONS  (PRN):  ondansetron Injectable 4 milliGRAM(s) IV Push every 6 hours PRN Nausea  oxyCODONE    IR 5 milliGRAM(s) Oral every 4 hours PRN Severe Pain (7 - 10)

## 2021-08-23 NOTE — PROGRESS NOTE ADULT - SUBJECTIVE AND OBJECTIVE BOX
GENERAL SURGERY PROGRESS NOTE    Patient: JEROME WILDER , 66y ([birth year])Female   MRN: [id number]  Location: [address]  Visit: 08-19-21 Inpatient  Date: 08-23-21 @ 07:47    Hospital Day #:  Post-Op Day #:    Procedure/Dx/Injuries:    Events of past 24 hours:    PAST MEDICAL & SURGICAL HISTORY:  High cholesterol    Morbid obesity  BMI=40    History of surgery  back &amp; neck cyst removal, cholecystecomy,  S/P bilateral cataract extraction    Vitals:   T(F): 97.2 (08-23-21 @ 04:52), Max: 98 (08-22-21 @ 13:17)  HR: 92 (08-23-21 @ 04:52)  BP: 144/72 (08-23-21 @ 04:52)  RR: 18 (08-23-21 @ 04:52)  SpO2: 96% (08-23-21 @ 04:52)    Diet, Clear Liquid    Fluids:     I & O's:    08-22-21 @ 07:01  -  08-23-21 @ 07:00  --------------------------------------------------------  IN:    dextrose 5% + sodium chloride 0.9%: 160 mL    Oral Fluid: 210 mL  Total IN: 370 mL    OUT:    Bulb (mL): 215 mL    Indwelling Catheter - Urethral (mL): 2050 mL  Total OUT: 2265 mL    Total NET: -1895 mL    Bowel Movement: : [] YES [x] NO  Flatus: : [x] YES [] NO    PHYSICAL EXAM:  General: NAD  HEENT: NC/AT, EOMI  Cardiac: RRR S1, S2, no Murmurs, rubs or gallops  Respiratory: CTAB, normal respiratory effort, breath sounds equal BL, no wheeze, rhonchi or crackles  Abdomen: Soft, non-distended, non-tender, no rebound, no guarding. +BS.  Rectal: Good tone, +stool, no blood, no kassidy-anal masses/lesions, no fistulas, fissures, hemorrhoids  Musculoskeletal: Strength 5/5 BL UE/LE, ROM intact, compartments soft  Neuro: Sensation grossly intact and equal throughout, no focal deficits  Vascular: Pulses 2+ throughout, extremities well perfused  Skin: Warm/dry, normal color, no jaundice  Incision/wound: healing well, dressings in place, clean, dry and intact    MEDICATIONS  (STANDING):  acetaminophen   Tablet .. 650 milliGRAM(s) Oral every 6 hours  atorvastatin 10 milliGRAM(s) Oral at bedtime  dextrose 5% + sodium chloride 0.9%. 1000 milliLiter(s) (40 mL/Hr) IV Continuous <Continuous>  enoxaparin Injectable 40 milliGRAM(s) SubCutaneous daily  pantoprazole  Injectable 40 milliGRAM(s) IV Push daily    MEDICATIONS  (PRN):  ondansetron Injectable 4 milliGRAM(s) IV Push every 6 hours PRN Nausea  oxyCODONE    IR 5 milliGRAM(s) Oral every 4 hours PRN Severe Pain (7 - 10)    DVT PROPHYLAXIS: enoxaparin Injectable 40 milliGRAM(s) SubCutaneous daily  GI PROPHYLAXIS: pantoprazole  Injectable 40 milliGRAM(s) IV Push daily    LAB/STUDIES:  Labs:                        10.0   9.05  )-----------( 343      ( 22 Aug 2021 23:27 )             32.6       Auto Neutrophil %: 62.4 % (08-22-21 @ 23:27)  Auto Immature Granulocyte %: 0.7 % (08-22-21 @ 23:27)    08-22    138  |  102  |  7<L>  ----------------------------<  96  4.0   |  26  |  0.6<L>    Calcium, Total Serum: 8.8 mg/dL (08-22-21 @ 23:27)    IMAGING: No new imaging past 24hrs     ACCESS/ DEVICES:  [x ] Peripheral IV  [ ] Central Venous Line	[ ] R	[ ] L	[ ] IJ	[ ] Fem	[ ] SC	Placed:   [ ] Arterial Line		[ ] R	[ ] L	[ ] Fem	[ ] Rad	[ ] Ax	Placed:   [ ] PICC:					[ ] Mediport  [ ] Urinary Catheter,  Date Placed:   [ ] Chest tube: [ ] Right, [ ] Left  [ ] LOGAN/Yuval Drains   GENERAL SURGERY PROGRESS NOTE    Patient: JEROME WILDER , 66y ([birth year])Female   MRN: [id number]  Location: [address]  Visit: 08-19-21 Inpatient  Date: 08-23-21 @ 07:47    Hospital Day #:5  Post-Op Day #:4    Procedure/Dx/Injuries: s/p robotic assisted sigmoidectomy converted to open with LAR, NAFISA-BSO    Events of past 24 hours: Patient seen and examined at bedside. No acute overnight events. Passing gas, no BM. Afebrile, VSS    PAST MEDICAL & SURGICAL HISTORY:  High cholesterol    Morbid obesity  BMI=40    History of surgery  back &amp; neck cyst removal, cholecystecomy,  S/P bilateral cataract extraction    Vitals:   T(F): 97.2 (08-23-21 @ 04:52), Max: 98 (08-22-21 @ 13:17)  HR: 92 (08-23-21 @ 04:52)  BP: 144/72 (08-23-21 @ 04:52)  RR: 18 (08-23-21 @ 04:52)  SpO2: 96% (08-23-21 @ 04:52)    Diet, Clear Liquid    Fluids:     I & O's:    08-22-21 @ 07:01  -  08-23-21 @ 07:00  --------------------------------------------------------  IN:    dextrose 5% + sodium chloride 0.9%: 160 mL    Oral Fluid: 210 mL  Total IN: 370 mL    OUT:    Bulb (mL): 215 mL    Indwelling Catheter - Urethral (mL): 2050 mL  Total OUT: 2265 mL    Total NET: -1895 mL    Bowel Movement: : [] YES [x] NO  Flatus: : [x] YES [] NO    PHYSICAL EXAM:  General: NAD  HEENT: NC/AT, EOMI  Cardiac: RRR S1, S2  Respiratory: CTAB, normal respiratory effort, breath sounds equal BL  Abdomen: Soft, non-distended, non-tender  Neuro: no focal deficits  Skin: Warm/dry, no jaundice     MEDICATIONS  (STANDING):  acetaminophen   Tablet .. 650 milliGRAM(s) Oral every 6 hours  atorvastatin 10 milliGRAM(s) Oral at bedtime  dextrose 5% + sodium chloride 0.9%. 1000 milliLiter(s) (40 mL/Hr) IV Continuous <Continuous>  enoxaparin Injectable 40 milliGRAM(s) SubCutaneous daily  pantoprazole  Injectable 40 milliGRAM(s) IV Push daily    MEDICATIONS  (PRN):  ondansetron Injectable 4 milliGRAM(s) IV Push every 6 hours PRN Nausea  oxyCODONE    IR 5 milliGRAM(s) Oral every 4 hours PRN Severe Pain (7 - 10)    DVT PROPHYLAXIS: enoxaparin Injectable 40 milliGRAM(s) SubCutaneous daily  GI PROPHYLAXIS: pantoprazole  Injectable 40 milliGRAM(s) IV Push daily    LAB/STUDIES:  Labs:                        10.0   9.05  )-----------( 343      ( 22 Aug 2021 23:27 )             32.6       Auto Neutrophil %: 62.4 % (08-22-21 @ 23:27)  Auto Immature Granulocyte %: 0.7 % (08-22-21 @ 23:27)    08-22    138  |  102  |  7<L>  ----------------------------<  96  4.0   |  26  |  0.6<L>    Calcium, Total Serum: 8.8 mg/dL (08-22-21 @ 23:27)    IMAGING: No new imaging past 24hrs     ACCESS/ DEVICES:  [x ] Peripheral IV  [ ] Central Venous Line	[ ] R	[ ] L	[ ] IJ	[ ] Fem	[ ] SC	Placed:   [ ] Arterial Line		[ ] R	[ ] L	[ ] Fem	[ ] Rad	[ ] Ax	Placed:   [ ] PICC:					[ ] Mediport  [ ] Urinary Catheter,  Date Placed:   [ ] Chest tube: [ ] Right, [ ] Left  [ ] LOGAN/Yuval Drains

## 2021-08-23 NOTE — DIETITIAN INITIAL EVALUATION ADULT. - ORAL INTAKE PTA/DIET HISTORY
Pt tolerating clear liquids well. Follows regular diet at home. Takes MVI, calcium, and fish oil at home.  lbs.

## 2021-08-23 NOTE — PROGRESS NOTE ADULT - ASSESSMENT
ASSESSMENT:  66y F w/ PMHx of  ****    PLAN:  -  -  -    BLUE TEAM SPECTRA: [id number]   ASSESSMENT:   66F POD4 s/p robotic converted to open low anterior resection, NAFISA BSO for sigmoid diverticulitis with colovesicle fistula.    PLAN:  -Continue Clear Liquid Diet per Dr. Ese Pro for 2 weeks. Removed as outpatient   -mIVF @ 40cc  -Monitor bowel functions  -Monitor labs and replete as necessary  -DVT/GI prophylaxis     BLUE TEAM SPECTRA: [id number]

## 2021-08-23 NOTE — DIETITIAN INITIAL EVALUATION ADULT. - OTHER INFO
Post-Op Day #:4  Procedure/Dx/Injuries: s/p robotic assisted sigmoidectomy converted to open with LAR, NAFISA-BSO  Pt passing gas, diet to be upgraded once she has a BM.

## 2021-08-23 NOTE — DIETITIAN INITIAL EVALUATION ADULT. - OTHER CALCULATIONS
Estimated needs based on ABW: MSJ x 1-1.2 (surgery) 1980 kcal/day.  g protein (0.8-1 g/kg). Fluids 1 mL/kcal or per LIP.

## 2021-08-24 LAB
-  AMPICILLIN/SULBACTAM: SIGNIFICANT CHANGE UP
-  CEFAZOLIN: SIGNIFICANT CHANGE UP
-  CLINDAMYCIN: SIGNIFICANT CHANGE UP
-  ERYTHROMYCIN: SIGNIFICANT CHANGE UP
-  GENTAMICIN: SIGNIFICANT CHANGE UP
-  OXACILLIN: SIGNIFICANT CHANGE UP
-  RIFAMPIN: SIGNIFICANT CHANGE UP
-  TETRACYCLINE: SIGNIFICANT CHANGE UP
-  TRIMETHOPRIM/SULFAMETHOXAZOLE: SIGNIFICANT CHANGE UP
-  VANCOMYCIN: SIGNIFICANT CHANGE UP
ANION GAP SERPL CALC-SCNC: 10 MMOL/L — SIGNIFICANT CHANGE UP (ref 7–14)
BASOPHILS # BLD AUTO: 0.05 K/UL — SIGNIFICANT CHANGE UP (ref 0–0.2)
BASOPHILS NFR BLD AUTO: 0.5 % — SIGNIFICANT CHANGE UP (ref 0–1)
BUN SERPL-MCNC: 8 MG/DL — LOW (ref 10–20)
CALCIUM SERPL-MCNC: 8.8 MG/DL — SIGNIFICANT CHANGE UP (ref 8.5–10.1)
CHLORIDE SERPL-SCNC: 105 MMOL/L — SIGNIFICANT CHANGE UP (ref 98–110)
CO2 SERPL-SCNC: 24 MMOL/L — SIGNIFICANT CHANGE UP (ref 17–32)
CREAT SERPL-MCNC: 0.7 MG/DL — SIGNIFICANT CHANGE UP (ref 0.7–1.5)
CULTURE RESULTS: SIGNIFICANT CHANGE UP
EOSINOPHIL # BLD AUTO: 0.34 K/UL — SIGNIFICANT CHANGE UP (ref 0–0.7)
EOSINOPHIL NFR BLD AUTO: 3.5 % — SIGNIFICANT CHANGE UP (ref 0–8)
GLUCOSE SERPL-MCNC: 109 MG/DL — HIGH (ref 70–99)
HCT VFR BLD CALC: 32.7 % — LOW (ref 37–47)
HGB BLD-MCNC: 10.1 G/DL — LOW (ref 12–16)
IMM GRANULOCYTES NFR BLD AUTO: 1 % — HIGH (ref 0.1–0.3)
LYMPHOCYTES # BLD AUTO: 1.74 K/UL — SIGNIFICANT CHANGE UP (ref 1.2–3.4)
LYMPHOCYTES # BLD AUTO: 17.8 % — LOW (ref 20.5–51.1)
MAGNESIUM SERPL-MCNC: 1.9 MG/DL — SIGNIFICANT CHANGE UP (ref 1.8–2.4)
MCHC RBC-ENTMCNC: 27.4 PG — SIGNIFICANT CHANGE UP (ref 27–31)
MCHC RBC-ENTMCNC: 30.9 G/DL — LOW (ref 32–37)
MCV RBC AUTO: 88.6 FL — SIGNIFICANT CHANGE UP (ref 81–99)
METHOD TYPE: SIGNIFICANT CHANGE UP
MONOCYTES # BLD AUTO: 0.92 K/UL — HIGH (ref 0.1–0.6)
MONOCYTES NFR BLD AUTO: 9.4 % — HIGH (ref 1.7–9.3)
NEUTROPHILS # BLD AUTO: 6.62 K/UL — HIGH (ref 1.4–6.5)
NEUTROPHILS NFR BLD AUTO: 67.8 % — SIGNIFICANT CHANGE UP (ref 42.2–75.2)
NRBC # BLD: 0 /100 WBCS — SIGNIFICANT CHANGE UP (ref 0–0)
ORGANISM # SPEC MICROSCOPIC CNT: SIGNIFICANT CHANGE UP
ORGANISM # SPEC MICROSCOPIC CNT: SIGNIFICANT CHANGE UP
PHOSPHATE SERPL-MCNC: 3.5 MG/DL — SIGNIFICANT CHANGE UP (ref 2.1–4.9)
PLATELET # BLD AUTO: 382 K/UL — SIGNIFICANT CHANGE UP (ref 130–400)
POTASSIUM SERPL-MCNC: 4.2 MMOL/L — SIGNIFICANT CHANGE UP (ref 3.5–5)
POTASSIUM SERPL-SCNC: 4.2 MMOL/L — SIGNIFICANT CHANGE UP (ref 3.5–5)
RBC # BLD: 3.69 M/UL — LOW (ref 4.2–5.4)
RBC # FLD: 16.3 % — HIGH (ref 11.5–14.5)
SODIUM SERPL-SCNC: 139 MMOL/L — SIGNIFICANT CHANGE UP (ref 135–146)
SPECIMEN SOURCE: SIGNIFICANT CHANGE UP
WBC # BLD: 9.77 K/UL — SIGNIFICANT CHANGE UP (ref 4.8–10.8)
WBC # FLD AUTO: 9.77 K/UL — SIGNIFICANT CHANGE UP (ref 4.8–10.8)

## 2021-08-24 PROCEDURE — 74018 RADEX ABDOMEN 1 VIEW: CPT | Mod: 26

## 2021-08-24 RX ORDER — MAGNESIUM SULFATE 500 MG/ML
2 VIAL (ML) INJECTION ONCE
Refills: 0 | Status: COMPLETED | OUTPATIENT
Start: 2021-08-24 | End: 2021-08-24

## 2021-08-24 RX ORDER — ASPIRIN/CALCIUM CARB/MAGNESIUM 324 MG
81 TABLET ORAL DAILY
Refills: 0 | Status: DISCONTINUED | OUTPATIENT
Start: 2021-08-24 | End: 2021-08-25

## 2021-08-24 RX ORDER — SODIUM CHLORIDE 9 MG/ML
500 INJECTION INTRAMUSCULAR; INTRAVENOUS; SUBCUTANEOUS ONCE
Refills: 0 | Status: DISCONTINUED | OUTPATIENT
Start: 2021-08-24 | End: 2021-08-24

## 2021-08-24 RX ADMIN — PANTOPRAZOLE SODIUM 40 MILLIGRAM(S): 20 TABLET, DELAYED RELEASE ORAL at 05:12

## 2021-08-24 RX ADMIN — Medication 650 MILLIGRAM(S): at 12:30

## 2021-08-24 RX ADMIN — Medication 650 MILLIGRAM(S): at 05:11

## 2021-08-24 RX ADMIN — Medication 81 MILLIGRAM(S): at 11:45

## 2021-08-24 RX ADMIN — Medication 650 MILLIGRAM(S): at 11:46

## 2021-08-24 RX ADMIN — Medication 50 GRAM(S): at 23:13

## 2021-08-24 RX ADMIN — Medication 650 MILLIGRAM(S): at 23:12

## 2021-08-24 RX ADMIN — ATORVASTATIN CALCIUM 10 MILLIGRAM(S): 80 TABLET, FILM COATED ORAL at 21:35

## 2021-08-24 RX ADMIN — Medication 650 MILLIGRAM(S): at 06:00

## 2021-08-24 RX ADMIN — Medication 650 MILLIGRAM(S): at 00:03

## 2021-08-24 RX ADMIN — ENOXAPARIN SODIUM 40 MILLIGRAM(S): 100 INJECTION SUBCUTANEOUS at 11:46

## 2021-08-24 RX ADMIN — Medication 650 MILLIGRAM(S): at 17:42

## 2021-08-24 NOTE — PROGRESS NOTE ADULT - SUBJECTIVE AND OBJECTIVE BOX
GENERAL SURGERY PROGRESS NOTE    Patient: JEROME WILDER , 66y ([birth year])Female   MRN: [id number]  Location: [address]  Visit: 08-19-21 Inpatient  Date: 08-24-21 @ 07:28    Hospital Day #:6  Post-Op Day #:5    Procedure/Dx/Injuries: s/p robotic assisted sigmoidectomy converted to open with LAR, NAFISA-BSO    Events of past 24 hours: Pt was seen and examined at bedside. Pt is passing gas but no BM. Currently on CLD. No acute overnight events     PAST MEDICAL & SURGICAL HISTORY:  High cholesterol  Morbid obesity  BMI=40    History of surgery  back &amp; neck cyst removal, cholecystecomy,  S/P bilateral cataract extraction    Vitals:   T(F): 97.8 (08-24-21 @ 04:44), Max: 97.8 (08-24-21 @ 04:44)  HR: 89 (08-24-21 @ 04:44)  BP: 120/60 (08-24-21 @ 04:44)  RR: 18 (08-24-21 @ 04:44)  SpO2: 97% (08-24-21 @ 04:44)    Diet, Clear Liquid    Fluids:   I & O's:    08-23-21 @ 07:01  -  08-24-21 @ 07:00  --------------------------------------------------------  IN:    dextrose 5% + sodium chloride 0.9%: 400 mL    Oral Fluid: 1316 mL  Total IN: 1716 mL    OUT:    Bulb (mL): 140 mL    Indwelling Catheter - Urethral (mL): 1700 mL  Total OUT: 1840 mL  Total NET: -124 mL    Bowel Movement: : [] YES [x] NO  Flatus: : [x] YES [] NO    PHYSICAL EXAM:  General: NAD  HEENT: NC/AT, EOMI, Neck supple  Cardiac: RRR S1, S2  Respiratory: CTAB, normal respiratory effort, breath sounds equal BL  Abdomen: Soft, non-distended, non-tender  Skin: Warm/dry, normal color, no jaundice    MEDICATIONS  (STANDING):  acetaminophen   Tablet .. 650 milliGRAM(s) Oral every 6 hours  atorvastatin 10 milliGRAM(s) Oral at bedtime  dextrose 5% + sodium chloride 0.9%. 1000 milliLiter(s) (40 mL/Hr) IV Continuous <Continuous>  enoxaparin Injectable 40 milliGRAM(s) SubCutaneous daily  pantoprazole    Tablet 40 milliGRAM(s) Oral before breakfast    MEDICATIONS  (PRN):  ondansetron Injectable 4 milliGRAM(s) IV Push every 6 hours PRN Nausea  oxyCODONE    IR 5 milliGRAM(s) Oral every 4 hours PRN Severe Pain (7 - 10)    DVT PROPHYLAXIS: enoxaparin Injectable 40 milliGRAM(s) SubCutaneous daily  GI PROPHYLAXIS: pantoprazole    Tablet 40 milliGRAM(s) Oral before breakfast    LAB/STUDIES:  Labs:                 11.0   9.15  )-----------( 407      ( 23 Aug 2021 21:13 )             35.0       Auto Neutrophil %: 61.9 % (08-23-21 @ 21:13)  Auto Immature Granulocyte %: 1.0 % (08-23-21 @ 21:13)    08-23    136  |  102  |  6<L>  ----------------------------<  104<H>  4.2   |  22  |  0.6<L>    Calcium, Total Serum: 8.7 mg/dL (08-23-21 @ 21:13)    IMAGING: No new imaging past 24hrs    ACCESS/ DEVICES:  [x] Peripheral IV  [ ] Central Venous Line	[ ] R	[ ] L	[ ] IJ	[ ] Fem	[ ] SC	Placed:   [ ] Arterial Line		[ ] R	[ ] L	[ ] Fem	[ ] Rad	[ ] Ax	Placed:   [ ] PICC:					[ ] Mediport  [ ] Urinary Catheter,  Date Placed:   [ ] Chest tube: [ ] Right, [ ] Left  [ x LOGAN/Yuval Drains

## 2021-08-24 NOTE — PROGRESS NOTE ADULT - ASSESSMENT
ASSESSMENT:   66F POD#5 s/p robotic converted to open low anterior resection, NAFISA BSO for sigmoid diverticulitis with colovesicle fistula.    PLAN:  -Clear Liquid Diet, possible advance as tolerated  -Collins for 2 weeks. Removed as outpatient   -Continue mIVF @ 40cc  -Monitor bowel functions  -Monitor labs and replete as necessary    BLUE TEAM SPECTRA: [id number]

## 2021-08-25 ENCOUNTER — TRANSCRIPTION ENCOUNTER (OUTPATIENT)
Age: 66
End: 2021-08-25

## 2021-08-25 VITALS
SYSTOLIC BLOOD PRESSURE: 130 MMHG | TEMPERATURE: 98 F | RESPIRATION RATE: 18 BRPM | DIASTOLIC BLOOD PRESSURE: 61 MMHG | HEART RATE: 88 BPM

## 2021-08-25 LAB
ANION GAP SERPL CALC-SCNC: 10 MMOL/L — SIGNIFICANT CHANGE UP (ref 7–14)
BASOPHILS # BLD AUTO: 0.02 K/UL — SIGNIFICANT CHANGE UP (ref 0–0.2)
BASOPHILS NFR BLD AUTO: 0.3 % — SIGNIFICANT CHANGE UP (ref 0–1)
BUN SERPL-MCNC: 7 MG/DL — LOW (ref 10–20)
CALCIUM SERPL-MCNC: 8.2 MG/DL — LOW (ref 8.5–10.1)
CHLORIDE SERPL-SCNC: 106 MMOL/L — SIGNIFICANT CHANGE UP (ref 98–110)
CO2 SERPL-SCNC: 25 MMOL/L — SIGNIFICANT CHANGE UP (ref 17–32)
CREAT SERPL-MCNC: 0.7 MG/DL — SIGNIFICANT CHANGE UP (ref 0.7–1.5)
EOSINOPHIL # BLD AUTO: 0.32 K/UL — SIGNIFICANT CHANGE UP (ref 0–0.7)
EOSINOPHIL NFR BLD AUTO: 4.4 % — SIGNIFICANT CHANGE UP (ref 0–8)
GLUCOSE SERPL-MCNC: 92 MG/DL — SIGNIFICANT CHANGE UP (ref 70–99)
HCT VFR BLD CALC: 30.2 % — LOW (ref 37–47)
HGB BLD-MCNC: 9.5 G/DL — LOW (ref 12–16)
IMM GRANULOCYTES NFR BLD AUTO: 1.2 % — HIGH (ref 0.1–0.3)
LYMPHOCYTES # BLD AUTO: 1.27 K/UL — SIGNIFICANT CHANGE UP (ref 1.2–3.4)
LYMPHOCYTES # BLD AUTO: 17.3 % — LOW (ref 20.5–51.1)
MAGNESIUM SERPL-MCNC: 2 MG/DL — SIGNIFICANT CHANGE UP (ref 1.8–2.4)
MCHC RBC-ENTMCNC: 27.8 PG — SIGNIFICANT CHANGE UP (ref 27–31)
MCHC RBC-ENTMCNC: 31.5 G/DL — LOW (ref 32–37)
MCV RBC AUTO: 88.3 FL — SIGNIFICANT CHANGE UP (ref 81–99)
MONOCYTES # BLD AUTO: 0.69 K/UL — HIGH (ref 0.1–0.6)
MONOCYTES NFR BLD AUTO: 9.4 % — HIGH (ref 1.7–9.3)
NEUTROPHILS # BLD AUTO: 4.93 K/UL — SIGNIFICANT CHANGE UP (ref 1.4–6.5)
NEUTROPHILS NFR BLD AUTO: 67.4 % — SIGNIFICANT CHANGE UP (ref 42.2–75.2)
NRBC # BLD: 0 /100 WBCS — SIGNIFICANT CHANGE UP (ref 0–0)
PHOSPHATE SERPL-MCNC: 3.6 MG/DL — SIGNIFICANT CHANGE UP (ref 2.1–4.9)
PLATELET # BLD AUTO: 318 K/UL — SIGNIFICANT CHANGE UP (ref 130–400)
POTASSIUM SERPL-MCNC: 4 MMOL/L — SIGNIFICANT CHANGE UP (ref 3.5–5)
POTASSIUM SERPL-SCNC: 4 MMOL/L — SIGNIFICANT CHANGE UP (ref 3.5–5)
RBC # BLD: 3.42 M/UL — LOW (ref 4.2–5.4)
RBC # FLD: 16.3 % — HIGH (ref 11.5–14.5)
SODIUM SERPL-SCNC: 141 MMOL/L — SIGNIFICANT CHANGE UP (ref 135–146)
WBC # BLD: 7.32 K/UL — SIGNIFICANT CHANGE UP (ref 4.8–10.8)
WBC # FLD AUTO: 7.32 K/UL — SIGNIFICANT CHANGE UP (ref 4.8–10.8)

## 2021-08-25 RX ADMIN — Medication 650 MILLIGRAM(S): at 12:00

## 2021-08-25 RX ADMIN — Medication 81 MILLIGRAM(S): at 11:11

## 2021-08-25 RX ADMIN — Medication 650 MILLIGRAM(S): at 11:13

## 2021-08-25 RX ADMIN — ENOXAPARIN SODIUM 40 MILLIGRAM(S): 100 INJECTION SUBCUTANEOUS at 11:13

## 2021-08-25 NOTE — DISCHARGE NOTE PROVIDER - NSDCCPCAREPLAN_GEN_ALL_CORE_FT
PRINCIPAL DISCHARGE DIAGNOSIS  Diagnosis: Colovesical fistula  Assessment and Plan of Treatment:       SECONDARY DISCHARGE DIAGNOSES  Diagnosis: Ovarian mass  Assessment and Plan of Treatment:

## 2021-08-25 NOTE — PROGRESS NOTE ADULT - ASSESSMENT
66F POD#6 s/p robotic converted to open low anterior resection, NAFISA BSO for sigmoid diverticulitis with colovesicle fistula.  pt tolerated po diet , + flatus no BM  PLAN:  -possible discharge home  today  -Collins for 2 weeks. Removed as outpatient   -continue current management and observation

## 2021-08-25 NOTE — DISCHARGE NOTE PROVIDER - CARE PROVIDERS DIRECT ADDRESSES
[email or web address]

## 2021-08-25 NOTE — DISCHARGE NOTE PROVIDER - NSDCCPTREATMENT_GEN_ALL_CORE_FT
PRINCIPAL PROCEDURE  Procedure: Laparoscopic low anterior resection of rectum and sigmoid colon with conversion to open procedure if indicated  Findings and Treatment:       SECONDARY PROCEDURE  Procedure: Hysterectomy, total, abdominal, with BSO  Findings and Treatment:     Procedure: Flexible sigmoidoscopy  Findings and Treatment:     Procedure: Repair of serosal tear of small intestine  Findings and Treatment:     Procedure: Lysis of intestinal adhesions  Findings and Treatment:     Procedure: Lysis of adhesions of peritoneum  Findings and Treatment:

## 2021-08-25 NOTE — PROGRESS NOTE ADULT - SUBJECTIVE AND OBJECTIVE BOX
Procedure: POD#6 s/p robotic converted to open low anterior resection, NAFISA BSO for sigmoid diverticulitis with colovesicle fistula.      PAST MEDICAL & SURGICAL HISTORY:  High cholesterol    Morbid obesity  BMI=40    History of surgery  back &amp; neck cyst removal, cholecystecomy,    S/P bilateral cataract extraction        24H EVENTS    Vital Signs Last 24 Hrs  T(C): 36.7 (25 Aug 2021 05:21), Max: 36.7 (25 Aug 2021 00:49)  T(F): 98 (25 Aug 2021 05:21), Max: 98 (25 Aug 2021 00:49)  HR: 91 (25 Aug 2021 05:21) (82 - 91)  BP: 117/60 (25 Aug 2021 05:21) (116/61 - 137/75)  BP(mean): --  RR: 18 (25 Aug 2021 05:21) (18 - 18)  SpO2: 99% (25 Aug 2021 05:21) (98% - 99%)        I&O's Detail    24 Aug 2021 07:01  -  25 Aug 2021 07:00  --------------------------------------------------------  IN:    Oral Fluid: 360 mL  Total IN: 360 mL    OUT:    Bulb (mL): 50 mL    Indwelling Catheter - Urethral (mL): 1100 mL  Total OUT: 1150 mL    Total NET: -790 mL                   9.5    7.32  )-----------( 318      (  @ 04:30 )             30.2                10.1   9.77  )-----------( 382      (  @ 20:30 )             32.7                11.0   9.15  )-----------( 407      (  @ 21:13 )             35.0                    141   |  106   |  7                  Ca: 8.2    BMP:   ----------------------------< 92     M.0   (21 @ 04:30)             4.0    |  25    | 0.7                Ph: 3.6        MEDICATIONS  (STANDING):  acetaminophen   Tablet .. 650 milliGRAM(s) Oral every 6 hours  aspirin  chewable 81 milliGRAM(s) Oral daily  atorvastatin 10 milliGRAM(s) Oral at bedtime  enoxaparin Injectable 40 milliGRAM(s) SubCutaneous daily  pantoprazole    Tablet 40 milliGRAM(s) Oral before breakfast    MEDICATIONS  (PRN):  ondansetron Injectable 4 milliGRAM(s) IV Push every 6 hours PRN Nausea  oxyCODONE    IR 5 milliGRAM(s) Oral every 4 hours PRN Severe Pain (7 - 10)      Diet, Regular:   Fiber/Residue Restricted (21 @ 13:21)      PHYSICAL EXAM:  General Appearance: Appears well, NAD  Chest: Equal expansion bilaterally, equal breath sounds  CV: S1, S2, RRR  Abdomen: Soft, NT, ND incision:  c/d/i  drain in place   Extremities: no calf tenderness  Neuro: A&Ox3

## 2021-08-25 NOTE — DISCHARGE NOTE PROVIDER - HOSPITAL COURSE
Patient is a 66 y/o female here for elective procedure for robotic assisted sigmoidectomy and resection of ovarian mass. Pt was seen in preop and informed about the potential removal of any abnormal gyn organs. During surgery, robotic-assisted was converted to open Exploratory laparotomy, multiple adhesions between small bowel, colon, bladder, uterus and left abdominal wall. Inflamed tissue noted on LLQ and pelvis area, fistula colovesical noted, low anterior resection of rectum and sigmoid performed, anastomosis with CEEA 28 mm, anastomotic donuts intact, leak test negative, flexible sigmoidoscopy performed. Bladder wall was reinforced with vicryl 2-0, No leak from bladder after administration of 300 cc of methylene blue. Repair of serosal tears of small bowel with interrupted suture. NAFISA w/ BSO performed by GYN due to dense inflammatory disease involving the uterus and her adnexa. Yuval drain left in pelvis, abdominal wall closure by layers. No suspicion for malignancy noted. The  procedure was reviewed with the patient preoperatively. She was informed about potential complications of surgery including but not limited to bowel, bladder, and ureteral injuries.  Pt worked with Physical therapy throughout the admission  Physiatry discharge plan: home with outpatient

## 2021-08-25 NOTE — PROGRESS NOTE ADULT - ATTENDING COMMENTS
Patient is a 66F POD1 s/p robotic converted to open low anterior resection, NAFISA BSO for sigmoid diverticulitis with colovesicle fistula.     Patient seen and examined.  No acute events over night.  Patient denies nausea vomiting flatus or BM.    Abdomen - soft, non distended, appropriately tender.  Dressings in place CDI    Vitals labs and images reviewed    Plan  - NPO with sips  - advance to clear liquids 8/21  - mIVF@40mL/hr  - maintain mejia for 2 weeks due to colovesicle fistula.  To be removed as outpatient.   - PT evaluation  - DVT PPX  - 24 hours antibiotics  - follow up bowel function
Patient is a 66F POD2 s/p robotic converted to open low anterior resection, NAFISA BSO for sigmoid diverticulitis with colovesicle fistula.     Patient seen and examined.  No acute events over night.  Patient denies nausea vomiting flatus or BM.    Abdomen - soft, non distended, appropriately tender.  Dressings in place CDI    Vitals labs and images reviewed    Plan  - Clear liquid diet  - mIVF@40mL/hr  - maintain mejia for 2 weeks due to colovesicle fistula.  To be removed as outpatient.   - PT evaluation  - DVT PPX  - follow up bowel function
Patient is a 66F POD3 s/p robotic converted to open low anterior resection, NAFISA BSO for sigmoid diverticulitis with colovesicle fistula.     Patient seen and examined.  No acute events over night.  Patient denies nausea vomiting flatus or BM. Tolerating CLD.    Abdomen - soft, non distended, appropriately tender.  Wound intact with staples.  No erythema induration or purulence.  LOGAN drain with serosanguinous drainage.    Vitals labs and images reviewed    Plan  - Clear liquid diet  - mIVF@40mL/hr  - maintain mejia for 2 weeks due to colovesicle fistula.  To be removed as outpatient.   - PT evaluation  - DVT PPX  - follow up bowel function
Patient is a 66F POD5 s/p robotic converted to open low anterior resection, NAFISA BSO for sigmoid diverticulitis with colovesicle fistula.     Patient seen and examined.  No acute events over night.  Patient denies nausea vomiting or BM. Tolerating CLD with flatus    Abdomen - soft, non distended, appropriately tender.  Wound intact with staples.  No erythema induration or purulence.  LOGAN drain with serosanguinous drainage.    Vitals labs and images reviewed    Plan  - low fiber diet  - DC IVF  - maintain mejia for 2 weeks due to colovesicle fistula.  To be removed as outpatient.   - PT evaluation  - DVT PPX  - Possible DC home in 24-48 hours
Patient is a 66F POD6 s/p robotic converted to open low anterior resection, NAFISA BSO for sigmoid diverticulitis with colovesicle fistula.     Patient seen and examined.  No acute events over night.  Patient denies nausea vomiting or BM. Tolerating LFD with flatus    Abdomen - soft, non distended, appropriately tender.  Wound intact with staples.  No erythema induration or purulence.  LOGAN drain with serosanguinous drainage.    Vitals labs and images reviewed    Plan  - low fiber diet  - DC LOGAN drain  - maintain mejia for 2 weeks due to colovesicle fistula.  To be removed as outpatient.   - PT evaluation  - DVT PPX  - Possible DC with mejia catheter.  Mejia to be removed in the office.
Patient is a 66F POD4 s/p robotic converted to open low anterior resection, NAFISA BSO for sigmoid diverticulitis with colovesicle fistula.     Patient seen and examined.  No acute events over night.  Patient denies nausea vomiting flatus or BM. Tolerating CLD.    Abdomen - soft, non distended, appropriately tender.  Wound intact with staples.  No erythema induration or purulence.  LOGAN drain with serosanguinous drainage.    Vitals labs and images reviewed    Plan  - Clear liquid diet  - mIVF@40mL/hr  - maintain mejia for 2 weeks due to colovesicle fistula.  To be removed as outpatient.   - PT evaluation  - DVT PPX  - follow up bowel function

## 2021-08-25 NOTE — DISCHARGE NOTE NURSING/CASE MANAGEMENT/SOCIAL WORK - NSDCPEFALRISK_GEN_ALL_CORE
For information on Fall & injury Prevention, visit [email or web address]

## 2021-08-25 NOTE — DISCHARGE NOTE PROVIDER - NSDCFUADDINST_GEN_ALL_CORE_FT
You are being discharged from [facility]. Please follow up with Dr. Mulligan as previously discussed. Please follow up with Dr. Gutierrez.  Please avoid heavy weight lifting for the next 4-6 weeks.  If you have any further questions about your care, please do not hesitate to contact Dr. Mulligan's clinic.     Please return to the ED with experiencing any chest pain, shortness of breath or any other complications.

## 2021-08-25 NOTE — DISCHARGE NOTE PROVIDER - CARE PROVIDER_API CALL
Lon Mulligan)  ColonRectal Surgery; Surgery  [address] [facility], [address]rd [place] [address]  Phone: [phone]  Fax: [phone]  Follow Up Time:     Shelly Gutierrez)  Gynecologic Oncology; Obstetrics and Gynecology  [address] [facility], [address] [place] [address]  Phone: [phone]  Fax: [phone]  Follow Up Time:

## 2021-08-25 NOTE — DISCHARGE NOTE PROVIDER - NSDCMRMEDTOKEN_GEN_ALL_CORE_FT
Aleve 220 mg oral tablet: 1 tab(s) orally every 12 hours, As Needed  aspirin 81 mg oral tablet: 1 tab(s) orally once a day  atorvastatin 10 mg oral tablet: 1 tab(s) orally once a day  Caltrate 600 mg oral tablet: 2 tab(s) orally once a day  Claritin 10 mg oral tablet: 1 tab(s) orally once a day

## 2021-08-25 NOTE — PROGRESS NOTE ADULT - PROVIDER SPECIALTY LIST ADULT
Colorectal Surgery
Gyn Onc

## 2021-08-26 LAB — SURGICAL PATHOLOGY STUDY: SIGNIFICANT CHANGE UP

## 2021-08-27 ENCOUNTER — NON-APPOINTMENT (OUTPATIENT)
Age: 66
End: 2021-08-27

## 2021-09-01 ENCOUNTER — APPOINTMENT (OUTPATIENT)
Dept: SURGERY | Facility: CLINIC | Age: 66
End: 2021-09-01
Payer: COMMERCIAL

## 2021-09-01 VITALS
HEIGHT: 65 IN | BODY MASS INDEX: 39.49 KG/M2 | HEART RATE: 85 BPM | SYSTOLIC BLOOD PRESSURE: 110 MMHG | WEIGHT: 237 LBS | DIASTOLIC BLOOD PRESSURE: 88 MMHG | TEMPERATURE: 97 F

## 2021-09-01 PROCEDURE — 99024 POSTOP FOLLOW-UP VISIT: CPT

## 2021-09-01 NOTE — ASSESSMENT
[FreeTextEntry1] : 66F s/p exploratory laparotomy, low anterior resection, NAFISA BSO (Dr. Gutierrez) for complicated sigmoid diverticulitis with colovesicle fistula and adnexal lesion.\par \par Patient is recovering well.  I recommended that she return to a high fiber diet and light physical activity.  We will see her back in 3 months.

## 2021-09-01 NOTE — CONSULT LETTER
[Dear  ___] : Dear  [unfilled], [Courtesy Letter:] : I had the pleasure of seeing your patient, [unfilled], in my office today. [Please see my note below.] : Please see my note below. [Consult Closing:] : Thank you very much for allowing me to participate in the care of this patient.  If you have any questions, please do not hesitate to contact me. [FreeTextEntry3] : Sincerely,\par \par Lon Mulligan MD, Colon and Rectal Surgery\par \par [employer] [facility]\par [address] [place] [address]\par [facility], 3rd Floor\par [place], [place] [address]\par Tel [phone] ext [phone]\par Fax [phone]\par  [DrJenn  ___] : Dr. DALAL

## 2021-09-01 NOTE — PHYSICAL EXAM
[Abdomen Masses] : No abdominal masses [Abdomen Tenderness] : ~T No ~M abdominal tenderness [Respiratory Effort] : normal respiratory effort [Normal Rate and Rhythm] : normal rate and rhythm [de-identified] : soft, obese, non tender, non distended.  Well healing wounds with staples in place.  Staples removed.  No erythema induration or purulence.  Collins catheter removed. [de-identified] : awake, alert and in no acute distress

## 2021-09-17 ENCOUNTER — APPOINTMENT (OUTPATIENT)
Dept: GYNECOLOGIC ONCOLOGY | Facility: CLINIC | Age: 66
End: 2021-09-17
Payer: COMMERCIAL

## 2021-09-17 VITALS
SYSTOLIC BLOOD PRESSURE: 135 MMHG | DIASTOLIC BLOOD PRESSURE: 82 MMHG | HEIGHT: 65 IN | WEIGHT: 237 LBS | BODY MASS INDEX: 39.49 KG/M2 | TEMPERATURE: 97.3 F

## 2021-09-17 PROCEDURE — 99024 POSTOP FOLLOW-UP VISIT: CPT

## 2021-09-17 NOTE — ASSESSMENT
[FreeTextEntry1] : 66 year old patient of  who was brought to OR by Dr. Mulligan (GI) for complicated sigmoid diverticulitis\par presents for post op.  I performed a NAFISA/BSO with benign pathology during the colon resection.   Patient presents for first GYN post operative visit.\par \par Surgical Final Report\par Final Diagnosis\par 1.  Colon, rectosigmoid, sigmoidectomy:\par - Diverticular disease with surrounding fibrosis and small focus\par of active mucosal inflammation\par - Adhesions and serositis\par - Negative for malignancy\par \par 2. Uterine cervix and corpus, right and left ovaries and\par fallopian tubes, hysterectomy and bilateral salpingo-\par oophorectomy:\par \par Cervix:\par - Nabothian cysts\par Uterus:\par - Inactive endometrium\par - Adenomyosis\par - Endometrial polyps\par - Negative for malignancy\par Fallopian tubes, right and left :\par - Fallopian tubes with prominent intraepithelial cytologic\par atypia, acute and chronic salpingitis, and metaplastic changes\par (see comment)\par - Extensive adhesions with congestion, acute and chronic\par inflammation\par Ovaries, right and left :\par - Serous cystadenoma\par - Benign simple cysts (para-adnexal)\par - Extensive adhesions with congestion, acute and chronic\par inflammation\par \par 3. Anastomotic donuts:\par - Colonic wall with no significant pathologic change.\par \par Verified by: Modesto Ruelas M.D\par (Electronic Signature)\par Reported on: 08/26/21 14:41 EDT, [address] [facility] [place] [address]\par Phone: [phone]   Fax: [phone]\par _________________________________________________________________\par \par Comment\par Bilateral fallopian tubes show prominent intra-epithelial\par cytologic atypia and metaplastic changes in a background of\par extensive acute and chronic salpingitis. Immunohistochemical\par stain for p53 shows a null type pattern.\par \par JEROME WILDER                      3\par \par Surgical Final Report\par \par Diagnosis of reactive atypia is favored; however a dysplastic\par process cannot be entirely excluded. The case was reviewed by \lien Lacey, who concurs with the diagnosis.\par \par

## 2021-09-17 NOTE — DISCUSSION/SUMMARY
[Clean] : was clean [Dry] : was dry [None] : had no drainage [Normal Skin] : normal appearance [Doing Well] : is doing well [Erythema] : was not erythematous [Ecchymosis] : was not ecchymotic [Seroma] : had no seroma [Firm] : soft [Tender] : nontender [Abnormal Bowel Sounds] : normal bowel sounds [Rebound] : no rebound tenderness [Guarding] : no guarding [Incisional Hernia] : no incisional hernia [Mass] : no palpable mass [External Genitalia Abnormal] : normal external genitalia [Vaginal Exam Abnormal] : normal vaginal exam

## 2021-09-17 NOTE — REASON FOR VISIT
[Post Op] : post op visit [de-identified] : August 19. 2021 [de-identified] : exploratory laparotomy, low anterior resection, NAFISA/BSO

## 2021-12-01 ENCOUNTER — APPOINTMENT (OUTPATIENT)
Dept: SURGERY | Facility: CLINIC | Age: 66
End: 2021-12-01
Payer: COMMERCIAL

## 2021-12-01 VITALS
HEART RATE: 89 BPM | HEIGHT: 65 IN | SYSTOLIC BLOOD PRESSURE: 138 MMHG | DIASTOLIC BLOOD PRESSURE: 90 MMHG | BODY MASS INDEX: 40.65 KG/M2 | TEMPERATURE: 97.2 F | WEIGHT: 244 LBS

## 2021-12-01 PROCEDURE — 99213 OFFICE O/P EST LOW 20 MIN: CPT

## 2021-12-01 NOTE — HISTORY OF PRESENT ILLNESS
[FreeTextEntry1] : Patient is a 66F with PMH of HLD, colon polyps, ovarian mass who presents for post operative visit s/p exploratory laparotomy, low anterior resection, NAFISA BSO (Dr. Gutierrez) for complicated sigmoid diverticulitis with colovesicle fistula and adnexal lesion.  Pathology showed diverticulitis and a serious cystadenoma. She presents today for follow up.  Patient is tolerating a diet and moving her bowels. Patient denies fevers, chills, nausea, vomiting, constipation, diarrhea, blood in her stool or unexpected weight loss.  Patient denies a family history of colon cancer rectal cancer or inflammatory bowel disease.

## 2021-12-01 NOTE — PHYSICAL EXAM
[Abdomen Masses] : No abdominal masses [Abdomen Tenderness] : ~T No ~M abdominal tenderness [Respiratory Effort] : normal respiratory effort [Normal Rate and Rhythm] : normal rate and rhythm [de-identified] : soft, obese, non tender, non distended.  Well healed wounds.  No erythema induration or purulence.  Collins catheter removed. [de-identified] : awake, alert and in no acute distress

## 2021-12-01 NOTE — ASSESSMENT
[FreeTextEntry1] : 66F s/p exploratory laparotomy, low anterior resection, NAFISA BSO (Dr. Gutierrez) for complicated sigmoid diverticulitis with colovesicle fistula and adnexal lesion.\par \par Patient has healed well from surgery.  She will continue with a high fiber diet.  We will see her back as needed.

## 2021-12-01 NOTE — CONSULT LETTER
[Dear  ___] : Dear  [unfilled], [Courtesy Letter:] : I had the pleasure of seeing your patient, [unfilled], in my office today. [Please see my note below.] : Please see my note below. [Consult Closing:] : Thank you very much for allowing me to participate in the care of this patient.  If you have any questions, please do not hesitate to contact me. [FreeTextEntry3] : Sincerely,\par \par Lon Mulligan MD, Colon and Rectal Surgery\par \par [employer] [facility]\par [address] [place] [address]\par [facility], 3rd Floor\par [place], [place] [address]\par Tel [phone] ext [phone]\par Fax [phone]\par

## 2022-02-05 ENCOUNTER — RESULT REVIEW (OUTPATIENT)
Age: 67
End: 2022-02-05

## 2022-02-05 ENCOUNTER — OUTPATIENT (OUTPATIENT)
Dept: OUTPATIENT SERVICES | Facility: HOSPITAL | Age: 67
LOS: 1 days | Discharge: HOME | End: 2022-02-05
Payer: COMMERCIAL

## 2022-02-05 DIAGNOSIS — Z98.41 CATARACT EXTRACTION STATUS, RIGHT EYE: Chronic | ICD-10-CM

## 2022-02-05 DIAGNOSIS — Z12.31 ENCOUNTER FOR SCREENING MAMMOGRAM FOR MALIGNANT NEOPLASM OF BREAST: ICD-10-CM

## 2022-02-05 DIAGNOSIS — Z98.890 OTHER SPECIFIED POSTPROCEDURAL STATES: Chronic | ICD-10-CM

## 2022-02-05 PROCEDURE — 77063 BREAST TOMOSYNTHESIS BI: CPT | Mod: 26

## 2022-02-05 PROCEDURE — 77067 SCR MAMMO BI INCL CAD: CPT | Mod: 26

## 2022-03-11 ENCOUNTER — APPOINTMENT (OUTPATIENT)
Dept: GYNECOLOGIC ONCOLOGY | Facility: CLINIC | Age: 67
End: 2022-03-11

## 2023-03-09 ENCOUNTER — OUTPATIENT (OUTPATIENT)
Dept: OUTPATIENT SERVICES | Facility: HOSPITAL | Age: 68
LOS: 1 days | End: 2023-03-09
Payer: MEDICARE

## 2023-03-09 DIAGNOSIS — Z12.31 ENCOUNTER FOR SCREENING MAMMOGRAM FOR MALIGNANT NEOPLASM OF BREAST: ICD-10-CM

## 2023-03-09 DIAGNOSIS — Z98.41 CATARACT EXTRACTION STATUS, RIGHT EYE: Chronic | ICD-10-CM

## 2023-03-09 DIAGNOSIS — Z98.890 OTHER SPECIFIED POSTPROCEDURAL STATES: Chronic | ICD-10-CM

## 2023-03-09 DIAGNOSIS — Z00.8 ENCOUNTER FOR OTHER GENERAL EXAMINATION: ICD-10-CM

## 2023-03-09 PROCEDURE — 77067 SCR MAMMO BI INCL CAD: CPT

## 2023-03-09 PROCEDURE — 77063 BREAST TOMOSYNTHESIS BI: CPT | Mod: 26

## 2023-03-09 PROCEDURE — 77063 BREAST TOMOSYNTHESIS BI: CPT

## 2023-03-09 PROCEDURE — 77067 SCR MAMMO BI INCL CAD: CPT | Mod: 26

## 2023-04-24 NOTE — PHYSICAL EXAM
Borderline controlled, continue current medications [General Appearance - Alert] : alert [Hearing Threshold Finger Rub Not [place]] : hearing was normal [] : no respiratory distress [Oriented To Time, Place, And Person] : oriented to person, place, and time

## 2023-05-25 ENCOUNTER — APPOINTMENT (OUTPATIENT)
Dept: UROGYNECOLOGY | Facility: CLINIC | Age: 68
End: 2023-05-25
Payer: MEDICARE

## 2023-05-25 VITALS
HEART RATE: 75 BPM | WEIGHT: 225 LBS | HEIGHT: 65 IN | BODY MASS INDEX: 37.49 KG/M2 | DIASTOLIC BLOOD PRESSURE: 87 MMHG | SYSTOLIC BLOOD PRESSURE: 141 MMHG

## 2023-05-25 DIAGNOSIS — N83.8 OTHER NONINFLAMMATORY DISORDERS OF OVARY, FALLOPIAN TUBE AND BROAD LIGAMENT: ICD-10-CM

## 2023-05-25 DIAGNOSIS — R93.5 ABNORMAL FINDINGS ON DIAGNOSTIC IMAGING OF OTHER ABDOMINAL REGIONS, INCLUDING RETROPERITONEUM: ICD-10-CM

## 2023-05-25 DIAGNOSIS — Z82.49 FAMILY HISTORY OF ISCHEMIC HEART DISEASE AND OTHER DISEASES OF THE CIRCULATORY SYSTEM: ICD-10-CM

## 2023-05-25 DIAGNOSIS — Z78.9 OTHER SPECIFIED HEALTH STATUS: ICD-10-CM

## 2023-05-25 DIAGNOSIS — Z90.710 ACQUIRED ABSENCE OF BOTH CERVIX AND UTERUS: ICD-10-CM

## 2023-05-25 DIAGNOSIS — Z87.19 PERSONAL HISTORY OF OTHER DISEASES OF THE DIGESTIVE SYSTEM: ICD-10-CM

## 2023-05-25 DIAGNOSIS — R39.89 OTHER SYMPTOMS AND SIGNS INVOLVING THE GENITOURINARY SYSTEM: ICD-10-CM

## 2023-05-25 DIAGNOSIS — N39.3 STRESS INCONTINENCE (FEMALE) (MALE): ICD-10-CM

## 2023-05-25 DIAGNOSIS — Z12.11 ENCOUNTER FOR SCREENING FOR MALIGNANT NEOPLASM OF COLON: ICD-10-CM

## 2023-05-25 DIAGNOSIS — N32.1 VESICOINTESTINAL FISTULA: ICD-10-CM

## 2023-05-25 PROCEDURE — 99205 OFFICE O/P NEW HI 60 MIN: CPT | Mod: 25

## 2023-05-25 PROCEDURE — 51701 INSERT BLADDER CATHETER: CPT

## 2023-05-25 RX ORDER — ASPIRIN 81 MG/1
81 TABLET ORAL DAILY
Refills: 0 | Status: COMPLETED | COMMUNITY
Start: 2021-06-30 | End: 2023-05-25

## 2023-05-25 RX ORDER — MULTIVIT-MIN/FA/LYCOPEN/LUTEIN .4-300-25
TABLET ORAL
Refills: 0 | Status: ACTIVE | COMMUNITY

## 2023-05-25 RX ORDER — NEOMYCIN SULFATE 500 MG/1
500 TABLET ORAL
Qty: 6 | Refills: 0 | Status: COMPLETED | COMMUNITY
Start: 2021-07-21 | End: 2023-05-25

## 2023-05-25 RX ORDER — METRONIDAZOLE 500 MG/1
500 TABLET ORAL
Qty: 6 | Refills: 0 | Status: COMPLETED | COMMUNITY
Start: 2021-07-21 | End: 2023-05-25

## 2023-05-25 RX ORDER — BISACODYL 5 MG/1
5 TABLET ORAL
Qty: 4 | Refills: 0 | Status: COMPLETED | COMMUNITY
Start: 2021-07-21 | End: 2023-05-25

## 2023-05-25 RX ORDER — POLYETHYLENE GLYCOL 3350 17 G/17G
17 POWDER, FOR SOLUTION ORAL
Qty: 1 | Refills: 0 | Status: COMPLETED | COMMUNITY
Start: 2021-07-21 | End: 2023-05-25

## 2023-05-25 NOTE — HISTORY OF PRESENT ILLNESS
[FreeTextEntry1] : \par Pt with pelvic floor dysfunction here for urogynecologic evaluation. She describes: \par \par Chief PFD: urinary incontinence\par \par Pelvic organ prolapse: s/p yanna, s/p ELAP/low anterior resection/NAFISA/BS0 (colovesical fistula) 2021, no bulge, \par Stress urinary incontinence: with cough, sneeze, IRISH: S>U\par Overactive bladder syndrome: urgency and frequency daily, daily urge incontinence, daily eneuresis, for years, worsening, no prior treatment, no glaucoma\par Voiding dysfunction: no Incomplete bladder emptying, no hesitancy \par Lower urinary tract/vaginal symptoms: no recurrent UTIs per year, no hematuria, no dysuria, no bladder pain \par Fecal incontinence: denies\par Defecatory dysfunction: sausage\par Sexual dysfunction: not active \par Pelvic pain: denies\par Vaginal dryness: denies\par \par Her pelvic floor symptoms are significantly bothersome and negatively impacting her quality of life. \par \par

## 2023-05-25 NOTE — DISCUSSION/SUMMARY
[FreeTextEntry1] : \par Mixed incontinence-\par The pathophysiology of the above condition was discussed with the patient. The management options for stress incontinence were discussed including observation, pessary placement, pelvic floor physical therapy or surgery (urethral bulking)\par \par I advised the patient that she should go to the ER or go to her PCP for further management for the groin abscess. The patient reports that she has been getting them her whole life, that it is nothing new, and does not want further management at this time. I advised the patient that with this history, I would not offer a midurethral sling.\par \par  We will follow up on her urine tests. The patient voiced understanding and agrees with a referral to PT.\par \par The patient was given information and education on pelvic floor muscle exercises/rehabilitation, avoidance of dietary bladder irritants, and other strategies to improve bladder control, such as scheduled voiding. She was counseled regarding further management strategies for overactive bladder and urge incontinence including pelvic floor physical therapy, medications or surgical management. The patient voiced understanding and agrees with diet changes, referral to PT and medical management. The risks and benefits of trospium was reviewed.\par \par

## 2023-05-25 NOTE — PHYSICAL EXAM
[Chaperone Present] : A chaperone was present in the examining room during all aspects of the physical examination [FreeTextEntry1] : Void: 200 cc\par PVR: 15 cc\par Urethra was prepped in sterile fashion and then a sterile non- indwelling catheter (14F) was used by me to drain the bladder. The patient tolerated the procedure well.\par Indication: mixed incontinence\par  \par Well healed incision: vertical, laparoscopic\par normal perineal sensation\par normal perineal reflexes\par negative cough stress test\par positive atrophy\par +labial erythema\par + R groin abscess, draining, Q tip goes 1 cm in\par no prolapse\par positive urethral hypermobility\par no urethral tenderness\par no bladder tenderness\par no cuff tenderness\par surgically absent uterus and cervix\par 0/5 Kegel\par

## 2023-05-25 NOTE — COUNSELING
[FreeTextEntry1] : \par We will notify you of the urine results if they are abnormal\par \par Please increase your water intake to at least 5 cups of plain water per day\par \par For 4-5 days, cut one item from the list out of your diet.\par \par After 4-5 days, it it makes a difference, you have to decide if it is worth keeping it out of your diet to help with the urinary issues.\par \par If it does not make a difference, you should add it back into your diet and remove another item for another 4-5 days.\par \par Please start taking the trospium (bladder medication) twice a day for the urgency and incontinence\par \par Please call my office if you have any issues with the cost or side effects of the medication.\par \par Schedule 6 week med check with my PAParul (IRISH)\par \par Referral to pelvic floor physical therapy\par \par [facility]\par [address] [place] [address]\par Phone: [phone]\par \par [facility]\par [address] [facility]\par [facility] [address]\par \par

## 2023-05-31 LAB — URINE CULTURE <10: NORMAL

## 2023-07-11 ENCOUNTER — APPOINTMENT (OUTPATIENT)
Dept: UROGYNECOLOGY | Facility: CLINIC | Age: 68
End: 2023-07-11
Payer: MEDICARE

## 2023-07-11 VITALS
HEART RATE: 80 BPM | HEIGHT: 65 IN | DIASTOLIC BLOOD PRESSURE: 81 MMHG | BODY MASS INDEX: 37.49 KG/M2 | SYSTOLIC BLOOD PRESSURE: 118 MMHG | WEIGHT: 225 LBS

## 2023-07-11 PROCEDURE — 99213 OFFICE O/P EST LOW 20 MIN: CPT

## 2023-07-12 NOTE — DISCUSSION/SUMMARY
[FreeTextEntry1] : Mixed Incontinence\par Patient happy with Trospium 20 mg BID.\par Refills provided. 30 days supply with 5 refills.\par Precautions reviewed.\par Will return in 6 months for follow up or earlier if she has any issues.\par \par \par

## 2023-07-12 NOTE — COUNSELING
[FreeTextEntry1] : If you feel like you have an infection it is important for you to call our office and we will arrange testing of your urine.\par \par Please continue taking Trospium 20 mg twice a day for frequency. Refills sent to your pharmacy.\par \par Please call my office if you have any issues with the cost or side effects of the medication. \par \par Schedule a 6 months follow up med check appointment with YAIR Teran.\par

## 2023-07-12 NOTE — HISTORY OF PRESENT ILLNESS
[FreeTextEntry1] : Patient is here for 6 weeks med check for urge incontinence.\par Last seen on 5/25/23 as a new patient with urinary incontinence. \par \par s/p yanna, s/p ELAP/low anterior resection/NAFISA/BS0 (colovesical fistula) 2021\par no glaucoma\par \par +labial erythema\par + R groin abscess, draining, Q tip goes 1 cm in\par no prolapse\par \par Diet \par Trospium 20mg BID\par PT referral\par \par Today, patient states she is happy with Trospium 20 mg BID and is noticing improvement. Feels 60% better overall.  Denies side effects. Patient does not feel she has an infection. Starter pelvic floor PT, has 2 sessions so far. \par \par Patient would like to continue Trospium 20 mg BID.\par

## 2023-08-28 NOTE — ASU PREOP CHECKLIST - HEIGHT IN CM
What Type Of Note Output Would You Prefer (Optional)?: Standard Output Hpi Title: Evaluation of a Skin Lesion How Severe Are Your Spot(S)?: moderate Have Your Spot(S) Been Treated In The Past?: has not been treated 165.1

## 2024-01-08 RX ORDER — TROSPIUM CHLORIDE 20 MG/1
20 TABLET, FILM COATED ORAL
Qty: 60 | Refills: 5 | Status: DISCONTINUED | COMMUNITY
Start: 2023-05-25 | End: 2024-01-08

## 2024-01-11 ENCOUNTER — APPOINTMENT (OUTPATIENT)
Dept: UROGYNECOLOGY | Facility: CLINIC | Age: 69
End: 2024-01-11

## 2024-03-11 ENCOUNTER — OUTPATIENT (OUTPATIENT)
Dept: OUTPATIENT SERVICES | Facility: HOSPITAL | Age: 69
LOS: 1 days | End: 2024-03-11
Payer: MEDICARE

## 2024-03-11 DIAGNOSIS — Z98.41 CATARACT EXTRACTION STATUS, RIGHT EYE: Chronic | ICD-10-CM

## 2024-03-11 DIAGNOSIS — Z12.31 ENCOUNTER FOR SCREENING MAMMOGRAM FOR MALIGNANT NEOPLASM OF BREAST: ICD-10-CM

## 2024-03-11 DIAGNOSIS — Z00.00 ENCOUNTER FOR GENERAL ADULT MEDICAL EXAMINATION WITHOUT ABNORMAL FINDINGS: ICD-10-CM

## 2024-03-11 DIAGNOSIS — Z98.890 OTHER SPECIFIED POSTPROCEDURAL STATES: Chronic | ICD-10-CM

## 2024-03-11 PROCEDURE — 77067 SCR MAMMO BI INCL CAD: CPT | Mod: 26

## 2024-03-11 PROCEDURE — 77063 BREAST TOMOSYNTHESIS BI: CPT

## 2024-03-11 PROCEDURE — 77067 SCR MAMMO BI INCL CAD: CPT

## 2024-03-11 PROCEDURE — 77063 BREAST TOMOSYNTHESIS BI: CPT | Mod: 26

## 2024-03-12 DIAGNOSIS — Z12.31 ENCOUNTER FOR SCREENING MAMMOGRAM FOR MALIGNANT NEOPLASM OF BREAST: ICD-10-CM

## 2024-03-13 RX ORDER — SOLIFENACIN SUCCINATE 10 MG/1
10 TABLET ORAL
Qty: 30 | Refills: 2 | Status: DISCONTINUED | COMMUNITY
Start: 2024-01-08 | End: 2024-03-13

## 2024-04-02 ENCOUNTER — APPOINTMENT (OUTPATIENT)
Dept: ORTHOPEDIC SURGERY | Facility: CLINIC | Age: 69
End: 2024-04-02
Payer: MEDICARE

## 2024-04-02 DIAGNOSIS — M16.12 UNILATERAL PRIMARY OSTEOARTHRITIS, LEFT HIP: ICD-10-CM

## 2024-04-02 PROCEDURE — 73502 X-RAY EXAM HIP UNI 2-3 VIEWS: CPT

## 2024-04-02 PROCEDURE — 99203 OFFICE O/P NEW LOW 30 MIN: CPT

## 2024-04-03 PROBLEM — M16.12 ARTHRITIS OF LEFT HIP: Status: ACTIVE | Noted: 2024-04-03

## 2024-05-15 ENCOUNTER — APPOINTMENT (OUTPATIENT)
Dept: UROGYNECOLOGY | Facility: CLINIC | Age: 69
End: 2024-05-15
Payer: MEDICARE

## 2024-05-15 VITALS
DIASTOLIC BLOOD PRESSURE: 85 MMHG | WEIGHT: 236 LBS | SYSTOLIC BLOOD PRESSURE: 159 MMHG | HEART RATE: 93 BPM | BODY MASS INDEX: 39.32 KG/M2 | HEIGHT: 65 IN

## 2024-05-15 DIAGNOSIS — N39.46 MIXED INCONTINENCE: ICD-10-CM

## 2024-05-15 PROCEDURE — 99213 OFFICE O/P EST LOW 20 MIN: CPT

## 2024-05-15 RX ORDER — VIBEGRON 75 MG/1
75 TABLET, FILM COATED ORAL
Qty: 90 | Refills: 1 | Status: ACTIVE | COMMUNITY
Start: 2024-03-13 | End: 1900-01-01

## 2024-05-15 RX ORDER — OMEGA-3/DHA/EPA/FISH OIL 300-1000MG
1000 CAPSULE ORAL
Refills: 0 | Status: COMPLETED | COMMUNITY
End: 2024-05-15

## 2024-05-15 NOTE — HISTORY OF PRESENT ILLNESS
[FreeTextEntry1] : Patient is here for 6 weeks med check for urge incontinence. Last seen on 5/25/23 as a new patient with urinary incontinence.  s/p yanna, s/p ELAP/low anterior resection/NAFISA/BS0 (colovesical fistula) 2021 no glaucoma +labial erythema + R groin abscess, draining, Q tip goes 1 cm in no prolapse  Diet PT referral  Trospium 20mg BID-60% improvement, stopped working as well Vesicare 5 mg, increased to 10 mg-caused double vision and blurry vision Gemtesa 75 mg  Today, patient states she is happy with Gemtesa 75 mg and is noticing improvement. Feels 85-90% improvement. Denies side effects. Patient does not feel she has an infection. Notes that if she drinks a lot of fluid before bed, she may get up once at night. Otherwise, she does not need to get up at night.  Taking Miralax and bowel recipe daily for constipation.  Tried PT but it caused hip pain, she will need hip replacement, so she stopped PT and has been doing exercises at home.   Patient would like to continue Gemtesa 75 mg.

## 2024-05-15 NOTE — COUNSELING
[FreeTextEntry1] : If you feel like you have an infection it is important for you to call our office and we will arrange testing of your urine.  Please continue taking Gemtesa 75 mg for frequency. Refills sent to your pharmacy.  Please continue the bowel recipe and Miralax for constipation control  Please call my office if you have any issues with the cost or side effects of the medication.   Schedule a 6 months follow up med check appointment.

## 2024-05-15 NOTE — REASON FOR VISIT
[TextEntry] :  Reason for visit: Follow up - medication check   Voids per day: 8-10  Voids per night: 0-1  Urge incontinence: Occasionally   Stress incontinence: not anymore  Constipation: Yes, taking MiraLAX   Fecal incontinence: No  Vaginal bulge: No

## 2024-05-15 NOTE — DISCUSSION/SUMMARY
[FreeTextEntry1] : Mixed Incontinence- Patient happy with Gemtesa 75 mg Refills provided. 90 days supply with 1 refills. Precautions reviewed. Will return in 6 months for follow up or earlier if she has any issues. Handout with pelvic floor exercises given to the patient

## 2024-06-10 NOTE — H&P PST ADULT - NS PRO ABUSE SCREEN AFRAID ANYONE YN
"Screening Questions for Radiology Injections:    Injection to be done at which interventional clinic site? [facility]    If choosing [facility] for location, please inform patient:  \"[facility] is a Hospital based clinic. Before your visit, you should check with your insurance about how it covers the charges for facility services in a hospital-based clinic.     Procedure ordered by Desi Amaya APRN CNP in [facility]    Procedure ordered? JONATHON with Dr. Gallegos   Transforaminal Cervical MORGAN - Send to [facility]) - No [facility] Site providers perform this procedure    What insurance would patient like us to bill for this procedure? Medicare/BC  IF SCHEDULING IN [place] PAIN OR SPINE PLEASE SCHEDULE AT LEAST 7-10 BUSINESS DAYS OUT SO A PA CAN BE OBTAINED  Worker's comp or MVA (motor vehicle accident) -Any injection DO NOT SCHEDULE and route to Gee Stoner.    [employer] insurance - For SI joint injections, DO NOT SCHEDULE and route to Luz Abbott.     ALL BCBS, [employer] and [employer] CIGNA - DO NOT SCHEDULE and route to Luz Abbott  MEDICA- ALL INJECTIONS- route to Luz Scar    Is patient scheduled at [facility]? NO    If YES, route every encounter to [facility] CARE NAVIGATION POOL [[phone]]    Is an  needed? No     Patient has a  home? (Review Grid) YES: Informed     Any chance of pregnancy? NO   If YES, do NOT schedule and route to RN pool  - Dr. Urbina route to Dorene Springer and PM&R Nurse  [[id number]]      Is patient actively being treated for cancer or immunocompromised? No  If YES, do NOT schedule and route to RN pool/ Dr. Urbina's Team    Does the patient have a bleeding or clotting disorder? No   If YES, okay to schedule AND route to RN nurse / Dr. Urbina's Team   (For any patients with platelet count <100, RN must forward to provider)    Is patient taking any Blood Thinners OR Antiplatelet medication?  No   If hold needed, do NOT " schedule, route to REBECCA welch/ Dr. Urbina's Team  Examples:   Blood Thinners: (Coumadin, Warfarin, Jantoven, Pradaxa, Xarelto, Eliquis, Edoxaban, Enoxaparin, Lovenox, Heparin, Arixtra, Fondaparinux or Fragmin)  Antiplatelet Medications: (Plavix, Brilinta or Effient)     Is patient taking any aspirin products (includes Excedrin and Fiorinal)? No   If yes route to RN pool/ Dr. Urbina's Team - Do not schedule      Any allergies to contrast dye, iodine, shellfish, or numbing and steroid medications? No  If YES, schedule and add allergy information to appointment notes AND route to the RN pool/ Dr. Urbina's Team  If MORGAN and Contrast Dye / Iodine Allergy? DO NOT SCHEDULE, route to RN pool/ Dr. Urbina's Team  Allergies: Gluten meal, Amoxicillin-pot clavulanate, Oxycontin [oxycodone], Pregabalin, Topiramate, Acetaminophen, Dust mite extract, Gabapentin, Ketorolac, Latex, Methadone, Mold, Naprosyn [naproxen], and Seasonal allergies     Does patient have an active infection or treated for one within the past week? No  Is patient currently taking any antibiotics or steroid medications?  Yes - Pt has 2 days left to take abx to prevent tooth infection.   For patients on chronic, preventative, or prophylactic antibiotics, procedures may be scheduled.   For patients on antibiotics for active or recent infection, schedule 4 days after completed.  For patients on steroid medications, schedule 4 days after completed.     Has the patient had a flu shot or any other vaccinations within the past 7 days? No  If yes, explain that for the vaccine to work best they need to:     wait 1 week before and 1 week after getting any Vaccine  wait 1 week before and 2 weeks after getting any Covid Vaccine   If patient has concerns about the timing, send to RN pool/ Dr. Urbina's Team    Does patient have an MRI/CT?  YES: CT 09/18/22 Include Date and Check Procedure Scheduling Grid to see if required.  Was the MRI/CT done within the last 3 years?  Yes    If no route to RN Pool/ Dr. Jos Team  If yes, where was the MRI/CT done? [facility]   Refer to PACS Transmissions list for approved external locations and route to RN Pool High Priority/ Dr. Magdaleno Team  If MRI was not done at approved external location do NOT schedule and route to RN pool/ Dr. Jos Team    If patient has an imaging disc, the injection MAY be scheduled but patient must bring disc to appt or appt will be cancelled.    Is patient able to transfer to a procedure table with minimal or no assistance? Yes   If no, do NOT schedule and route to RN Pool/ Dr. Urbina's Team    Procedure Specific Instructions:  If celiac plexus block, informed patient NPO for 6 hours and that it is okay to take medications with sips of water, especially blood pressure medications Not Applicable       If this is for a cervical procedure, informed patient that aspirin needs to be held for 6 days.   Not Applicable    Sedation, If Sedation is ordered for any procedure, patient must be NPO for 6 hours prior to procedure Not Applicable    If IV needed:  Do not schedule procedures requiring IV placement in the first appointment of the day or first appointment after lunch. Do NOT schedule at 0745, 0815 or 1245.     Instructed patient to arrive 30 minutes early for IV start if required. (Check Procedure Scheduling Grid)  YES: Informed     Reminders:  If you are started on any steroids or antibiotics between now and your appointment, you must contact us because the procedure may need to be cancelled.  Yes    As a reminder, receiving steroids can decrease your body's ability to fight infection.   Would you still like to move forward with scheduling the injection?  Yes    IV Sedation is not provided for procedures. If oral anti-anxiety medication is needed, the patient should request this from their referring provider.    Instruct patient to arrive as directed prior to the scheduled appointment time:  If IV needed 30 minutes  before appointment time     For patients 85 or older we recommend having an adult stay w/ them for the remainder of the day.     If the patient is Diabetic, remind them to bring their glucometer.      Does the patient have any questions?  NO  Martha Guzman  [facility]      no

## 2024-06-26 RX ORDER — NAPROXEN SODIUM 220 MG
220 TABLET ORAL 3 TIMES DAILY
Refills: 0 | Status: DISCONTINUED | COMMUNITY
Start: 2021-06-30 | End: 2024-06-26

## 2024-07-02 ENCOUNTER — APPOINTMENT (OUTPATIENT)
Dept: GASTROENTEROLOGY | Facility: CLINIC | Age: 69
End: 2024-07-02
Payer: MEDICARE

## 2024-07-02 DIAGNOSIS — Z12.11 ENCOUNTER FOR SCREENING FOR MALIGNANT NEOPLASM OF COLON: ICD-10-CM

## 2024-07-02 DIAGNOSIS — Z86.010 PERSONAL HISTORY OF COLONIC POLYPS: ICD-10-CM

## 2024-07-02 PROCEDURE — 99204 OFFICE O/P NEW MOD 45 MIN: CPT

## 2024-07-02 RX ORDER — PSYLLIUM SEED
PACKET (EA) ORAL
Refills: 0 | Status: ACTIVE | COMMUNITY

## 2024-07-02 RX ORDER — PEG-3350, SODIUM SULFATE, SODIUM CHLORIDE, POTASSIUM CHLORIDE, SODIUM ASCORBATE AND ASCORBIC ACID 7.5-2.691G
100 KIT ORAL
Qty: 1 | Refills: 0 | Status: ACTIVE | COMMUNITY
Start: 2024-07-02 | End: 1900-01-01

## 2024-08-26 NOTE — CONSULT NOTE ADULT - ASSESSMENT
Rx for Ofloxacin 0.3% solution, 10 drops in right ear daily x 7 days  Keep your ears dry (apart from the drops)- It is best to avoid swimming and getting water in the ears whilst you have otitis externa  Monitor  Return to clinic with any concerns.     IMPRESSION: Rehab of gait dysfunction / s/p colon resection    PRECAUTIONS: [   ] Cardiac  [   ] Respiratory  [   ] Seizures [   ] Contact Isolation  [   ] Droplet Isolation  [   ] Other    Weight Bearing Status:     RECOMMENDATION:    Out of Bed to Chair     DVT/Decubiti Prophylaxis    REHAB PLAN:     [ x   ] Bedside P/T 3-5 times a week   [    ]   Bedside O/T  2-3 times a week             [    ] Speech Therapy               [    ]  No Rehab Therapy Indicated   Conditioning/ROM                                    ADL  Bed Mobility                                               Conditioning/ROM  Transfers                                                     Bed Mobility  Sitting /Standing Balance                         Transfers                                        Gait Training                                               Sitting/Standing Balance  Stair Training [   ]Applicable                    Home equipment Eval                                                                        Splinting  [   ] Only      GOALS:   ADL   [    ]   Independent                    Transfers  [  x  ] Independent                          Ambulation  [   x ] Independent     [  x   ] With device                            [    ]  CG                                                         [    ]  CG                                                                  [    ] CG                            [    ] Min A                                                   [    ] Min A                                                              [    ] Min  A          DISCHARGE PLAN:   [    ]  Good candidate for Intensive Rehabilitation/Hospital based                                             Will tolerate 3hrs Intensive Rehab Daily                                       [   x  ]  Short Term Rehab in Skilled Nursing Facility                               vs        [ x    ]  Home with Outpatient or VN services                                         [     ]  Possible Candidate for Intensive Hospital based Rehab

## 2024-11-11 ENCOUNTER — APPOINTMENT (OUTPATIENT)
Dept: UROGYNECOLOGY | Facility: CLINIC | Age: 69
End: 2024-11-11
Payer: MEDICARE

## 2024-11-11 VITALS
BODY MASS INDEX: 39.32 KG/M2 | DIASTOLIC BLOOD PRESSURE: 66 MMHG | HEIGHT: 65 IN | HEART RATE: 75 BPM | SYSTOLIC BLOOD PRESSURE: 146 MMHG | WEIGHT: 236 LBS

## 2024-11-11 DIAGNOSIS — N39.46 MIXED INCONTINENCE: ICD-10-CM

## 2024-11-11 DIAGNOSIS — K59.00 CONSTIPATION, UNSPECIFIED: ICD-10-CM

## 2024-11-11 PROCEDURE — G2211 COMPLEX E/M VISIT ADD ON: CPT

## 2024-11-11 PROCEDURE — 99214 OFFICE O/P EST MOD 30 MIN: CPT

## 2024-11-11 NOTE — HISTORY OF PRESENT ILLNESS
[FreeTextEntry1] : Patient is here for 6 months med check for urge incontinence. Last seen on 5/15/24 for med check.  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 Feels 75% better. Denies side effects. Patient does not feel she has an infection. Uses Miralax daily and bowel recipe 2 spoons every night and doesn't always have bowel movement. Has routine Colonoscopy next week.

## 2024-11-11 NOTE — DISCUSSION/SUMMARY
[FreeTextEntry1] : Mixed Incontinence Patient happy with Gemtesa 75 mg BID. Refills provided. 90 days supply with 3 refills. Precautions reviewed. Will return in 12 months for follow up or earlier if she has any issues.   Constipation Advised to use bowel recipe and increase the amount she is using

## 2024-11-11 NOTE — REASON FOR VISIT
[TextEntry] : Reason for visit: 6 Month Medication Check Voids per day: 8-10   Voids per night: 0-1   Urge incontinence: Occasionally Stress incontinence: No Constipation: Yes, uses Miralax daily  Fecal incontinence: No Vaginal bulge: No

## 2024-11-11 NOTE — COUNSELING
[FreeTextEntry1] : If you feel like you have an infection it is important for you to call our office and we will arrange testing of your urine.  Please continue taking Gemtesa 75mg for frequency. Refills sent to your pharmacy.  Please follow our recommended bowel recipe to control your constipation. You may go up to 5-7 tablespoons twice a day.   Please call my office if you have any issues with the cost or side effects of the medication.   Schedule a 12 months follow up med check appointment with MITCH Meredith.

## 2024-11-18 ENCOUNTER — RESULT REVIEW (OUTPATIENT)
Age: 69
End: 2024-11-18

## 2024-11-18 ENCOUNTER — TRANSCRIPTION ENCOUNTER (OUTPATIENT)
Age: 69
End: 2024-11-18

## 2024-11-18 ENCOUNTER — OUTPATIENT (OUTPATIENT)
Dept: OUTPATIENT SERVICES | Facility: HOSPITAL | Age: 69
LOS: 1 days | Discharge: HOME | End: 2024-11-18
Payer: MEDICARE

## 2024-11-18 VITALS
OXYGEN SATURATION: 100 % | TEMPERATURE: 98 F | HEIGHT: 65 IN | DIASTOLIC BLOOD PRESSURE: 82 MMHG | WEIGHT: 235.01 LBS | HEART RATE: 86 BPM | SYSTOLIC BLOOD PRESSURE: 136 MMHG | RESPIRATION RATE: 18 BRPM

## 2024-11-18 VITALS — DIASTOLIC BLOOD PRESSURE: 90 MMHG | RESPIRATION RATE: 20 BRPM | SYSTOLIC BLOOD PRESSURE: 175 MMHG | HEART RATE: 75 BPM

## 2024-11-18 DIAGNOSIS — Z98.890 OTHER SPECIFIED POSTPROCEDURAL STATES: Chronic | ICD-10-CM

## 2024-11-18 DIAGNOSIS — Z98.41 CATARACT EXTRACTION STATUS, RIGHT EYE: Chronic | ICD-10-CM

## 2024-11-18 DIAGNOSIS — K59.00 CONSTIPATION, UNSPECIFIED: ICD-10-CM

## 2024-11-18 DIAGNOSIS — Z12.11 ENCOUNTER FOR SCREENING FOR MALIGNANT NEOPLASM OF COLON: ICD-10-CM

## 2024-11-18 DIAGNOSIS — Z90.710 ACQUIRED ABSENCE OF BOTH CERVIX AND UTERUS: Chronic | ICD-10-CM

## 2024-11-18 PROCEDURE — 88305 TISSUE EXAM BY PATHOLOGIST: CPT | Mod: 26

## 2024-11-18 PROCEDURE — 88305 TISSUE EXAM BY PATHOLOGIST: CPT

## 2024-11-18 PROCEDURE — 45390 COLONOSCOPY W/RESECTION: CPT

## 2024-11-18 PROCEDURE — 45380 COLONOSCOPY AND BIOPSY: CPT | Mod: XU

## 2024-11-18 NOTE — ASU PATIENT PROFILE, ADULT - FALL HARM RISK - HARM RISK INTERVENTIONS

## 2024-11-18 NOTE — ASU DISCHARGE PLAN (ADULT/PEDIATRIC) - NS MD DC FALL RISK RISK
For information on Fall & Injury Prevention, visit: [email or web address] OR  [email or web address] OR  https://www.cdc.gov/steadi/patient.html

## 2024-11-18 NOTE — CHART NOTE - NSCHARTNOTEFT_GEN_A_CORE
PACU ANESTHESIA ADMISSION NOTE      Procedure:   Post op diagnosis:      ____  Intubated  TV:______       Rate: ______      FiO2: ______    _x___  Patent Airway    _x___  Full return of protective reflexes    _x___  Full recovery from anesthesia / back to baseline status    Vitals:  T(C): 36.4 (11-18-24 @ 11:46), Max: 36.4 (11-18-24 @ 11:46)    BP  130/70  P  82  R  15  Sat  99    Mental Status:  _x___ Awake   _____ Alert   _____ Drowsy   _____ Sedated    Nausea/Vomiting:  _x___  NO       ______Yes,   See Post - Op Orders         Pain Scale (0-10):  __0___    Treatment: _x___ None    ____ See Post - Op/PCA Orders    Post - Operative Fluids:   __x__ Oral   ____ See Post - Op Orders    Plan: Discharge:   _x___Home       _____Floor     _____Critical Care    _____  Other:_________________    Comments:  No anesthesia issues or complications noted.  Discharge when criteria met.

## 2024-11-18 NOTE — H&P PST ADULT - ASSESSMENT
69 year old female patient average risk for CRC, hx of colon polyps in 2021, HLP, hx of diverticulitis s/p sigmoid resection because of colovesicular fistula, presents for surveillance colonoscopy.  Pt reporst constipation after treatment for irritable bladder, bow on stool softeners.  patient denies nausea, vomiting, hematemesis, melena, blood in stool, diarrhea, abdominal pain, unintentional weight loss or dysphagia.  ?  ?  Surveillance colonoscopy  Risks and benefits discussed with patient.  Miralax for 2 weeks PTP.

## 2024-11-18 NOTE — ASU PATIENT PROFILE, ADULT - NSICDXPASTSURGICALHX_GEN_ALL_CORE_FT
PAST SURGICAL HISTORY:  History of surgery back & neck cyst removal, cholecystecomy,    S/P bilateral cataract extraction     S/P hysterectomy

## 2024-11-18 NOTE — ASU DISCHARGE PLAN (ADULT/PEDIATRIC) - CARE PROVIDER_API CALL
Lucia Flores  Gastroenterology  [address] [place] [address]  Phone: [phone]  Fax: [phone]  Follow Up Time:

## 2024-11-18 NOTE — ASU DISCHARGE PLAN (ADULT/PEDIATRIC) - FINANCIAL ASSISTANCE
[facility] provides services at a reduced cost to those who are determined to be eligible through [facility]’s financial assistance program. Information regarding [facility]’s financial assistance program can be found by going to [email or web address] or by calling [phone].

## 2024-11-20 LAB — SURGICAL PATHOLOGY STUDY: SIGNIFICANT CHANGE UP

## 2024-11-24 DIAGNOSIS — D12.3 BENIGN NEOPLASM OF TRANSVERSE COLON: ICD-10-CM

## 2024-11-24 DIAGNOSIS — Z86.0100 PERSONAL HISTORY OF COLON POLYPS, UNSPECIFIED: ICD-10-CM

## 2024-11-24 DIAGNOSIS — E66.01 MORBID (SEVERE) OBESITY DUE TO EXCESS CALORIES: ICD-10-CM

## 2024-11-24 DIAGNOSIS — Z12.11 ENCOUNTER FOR SCREENING FOR MALIGNANT NEOPLASM OF COLON: ICD-10-CM

## 2024-11-24 DIAGNOSIS — Z79.82 LONG TERM (CURRENT) USE OF ASPIRIN: ICD-10-CM

## 2024-11-24 DIAGNOSIS — K64.4 RESIDUAL HEMORRHOIDAL SKIN TAGS: ICD-10-CM

## 2024-11-24 DIAGNOSIS — E78.00 PURE HYPERCHOLESTEROLEMIA, UNSPECIFIED: ICD-10-CM

## 2025-01-15 NOTE — PRE-ANESTHESIA EVALUATION ADULT - NSANTHDIETYNSD_GEN_ALL_CORE
RT Inhaler-Nebulizer Bronchodilator Protocol Note    There is a bronchodilator order in the chart from a provider indicating to follow the RT Bronchodilator Protocol and there is an “Initiate RT Inhaler-Nebulizer Bronchodilator Protocol” order as well (see protocol at bottom of note).    CXR Findings:  XR CHEST PORTABLE    Result Date: 1/13/2025  Cardiomegaly. No focal airspace consolidation.       The findings from the last RT Protocol Assessment were as follows:   History Pulmonary Disease: Chronic pulmonary disease  Respiratory Pattern: Dyspnea on exertion or RR 21-25 bpm  Breath Sounds: Slightly diminished and/or crackles  Cough: Strong, productive  Indication for Bronchodilator Therapy: On home bronchodilators  Bronchodilator Assessment Score: 7    Aerosolized bronchodilator medication orders have been revised according to the RT Inhaler-Nebulizer Bronchodilator Protocol below.    Respiratory Therapist to perform RT Therapy Protocol Assessment initially then follow the protocol.  Repeat RT Therapy Protocol Assessment PRN for score 0-3 or on second treatment, BID, and PRN for scores above 3.    No Indications - adjust the frequency to every 6 hours PRN wheezing or bronchospasm, if no treatments needed after 48 hours then discontinue using Per Protocol order mode.     If indication present, adjust the RT bronchodilator orders based on the Bronchodilator Assessment Score as indicated below.  Use Inhaler orders unless patient has one or more of the following: on home nebulizer, not able to hold breath for 10 seconds, is not alert and oriented, cannot activate and use MDI correctly, or respiratory rate 25 breaths per minute or more, then use the equivalent nebulizer order(s) with same Frequency and PRN reasons based on the score.  If a patient is on this medication at home then do not decrease Frequency below that used at home.    0-3 - enter or revise RT bronchodilator order(s) to equivalent RT Bronchodilator order  Yes

## 2025-02-18 ENCOUNTER — APPOINTMENT (OUTPATIENT)
Dept: ORTHOPEDIC SURGERY | Facility: CLINIC | Age: 70
End: 2025-02-18
Payer: MEDICARE

## 2025-02-18 DIAGNOSIS — M16.12 UNILATERAL PRIMARY OSTEOARTHRITIS, LEFT HIP: ICD-10-CM

## 2025-02-18 PROCEDURE — 99215 OFFICE O/P EST HI 40 MIN: CPT

## 2025-02-18 PROCEDURE — 73502 X-RAY EXAM HIP UNI 2-3 VIEWS: CPT

## 2025-02-18 NOTE — HISTORY OF PRESENT ILLNESS
[de-identified] :  HISTORY Left hip pain. Mechanical pain made worse with activity, not completely improved with rest, interfering with ADLs. At this point nonoperative management of the symptoms is ineffective and the patient has interest in moving forward with a joint replacement.   Past medical history is on the chart for review and as mentioned above.   ROS is negative for fever, chills, CP, SOB, unexplained weight loss or gain.       EXAM LEFT HIP Pain with PROM and limited in flexion and internal rotation. There is significant guarding throughout the exam limiting ROM testing but no obvious contractures. NVID Decreased hip flexion strength compared to contralateral side.     Imaging: X-rays AP Pelvis and Frog Lateral of the hips shows advanced degenerative changes of left hip including loss of the joint space with bone to bone apposition, osteophytes, subchondral sclerosis and cystic changes.  Kellgren Hans grade 4 changes evident throughout the hip.     Assessment: End stage left arthritic hip.     Plan is for a left total hip replacement. The surgery, preoperative workup, surgical procedure, hospital course, post operative course, rehab, and expected outcomes were discussed. A description of the surgery in layman's terms, using models equivalent to the implants used during surgery, was a part of this conversation.   The patient will require a rolling walker for 2-4 weeks postoperativley due to gait instability to help with mobility related ADL's and a commode as they confined to a one level without access to a bathroom.  Patient is able to safely use the walker and functional mobility deficit can be sufficiently resolved with use of a walker.   The risks and benefits of the surgery were discussed.  Benefits  were described as improvement in pain and function.  Risks including bleeding, infection, blood clots, DVT, PE, stroke, heart attack, fracture, dislocation, leg length discrepancy, nerve palsy, neurovascular injury, persistent pain, RSD, and in rare cases death. Should the implant not function as expected or wear out then revision surgery may be required in the future.   We will proceed with scheduling the procedure.

## 2025-03-25 ENCOUNTER — OUTPATIENT (OUTPATIENT)
Dept: OUTPATIENT SERVICES | Facility: HOSPITAL | Age: 70
LOS: 1 days | End: 2025-03-25
Payer: MEDICARE

## 2025-03-25 ENCOUNTER — RESULT REVIEW (OUTPATIENT)
Age: 70
End: 2025-03-25

## 2025-03-25 VITALS
TEMPERATURE: 98 F | DIASTOLIC BLOOD PRESSURE: 90 MMHG | OXYGEN SATURATION: 100 % | RESPIRATION RATE: 18 BRPM | HEIGHT: 65 IN | HEART RATE: 89 BPM | SYSTOLIC BLOOD PRESSURE: 150 MMHG | WEIGHT: 231.49 LBS

## 2025-03-25 DIAGNOSIS — Z01.818 ENCOUNTER FOR OTHER PREPROCEDURAL EXAMINATION: ICD-10-CM

## 2025-03-25 DIAGNOSIS — Z98.41 CATARACT EXTRACTION STATUS, RIGHT EYE: Chronic | ICD-10-CM

## 2025-03-25 DIAGNOSIS — M16.12 UNILATERAL PRIMARY OSTEOARTHRITIS, LEFT HIP: ICD-10-CM

## 2025-03-25 DIAGNOSIS — Z98.890 OTHER SPECIFIED POSTPROCEDURAL STATES: Chronic | ICD-10-CM

## 2025-03-25 DIAGNOSIS — Z90.710 ACQUIRED ABSENCE OF BOTH CERVIX AND UTERUS: Chronic | ICD-10-CM

## 2025-03-25 LAB
A1C WITH ESTIMATED AVERAGE GLUCOSE RESULT: 5.7 % — HIGH (ref 4–5.6)
ALBUMIN SERPL ELPH-MCNC: 4 G/DL — SIGNIFICANT CHANGE UP (ref 3.5–5.2)
ALP SERPL-CCNC: 119 U/L — HIGH (ref 30–115)
ALT FLD-CCNC: 15 U/L — SIGNIFICANT CHANGE UP (ref 0–41)
ANION GAP SERPL CALC-SCNC: 9 MMOL/L — SIGNIFICANT CHANGE UP (ref 7–14)
APTT BLD: 36.4 SEC — SIGNIFICANT CHANGE UP (ref 27–39.2)
AST SERPL-CCNC: 16 U/L — SIGNIFICANT CHANGE UP (ref 0–41)
BASOPHILS # BLD AUTO: 0.05 K/UL — SIGNIFICANT CHANGE UP (ref 0–0.2)
BASOPHILS NFR BLD AUTO: 0.8 % — SIGNIFICANT CHANGE UP (ref 0–1)
BILIRUB SERPL-MCNC: 0.4 MG/DL — SIGNIFICANT CHANGE UP (ref 0.2–1.2)
BLD GP AB SCN SERPL QL: SIGNIFICANT CHANGE UP
BUN SERPL-MCNC: 16 MG/DL — SIGNIFICANT CHANGE UP (ref 10–20)
CALCIUM SERPL-MCNC: 9.5 MG/DL — SIGNIFICANT CHANGE UP (ref 8.4–10.5)
CHLORIDE SERPL-SCNC: 104 MMOL/L — SIGNIFICANT CHANGE UP (ref 98–110)
CO2 SERPL-SCNC: 27 MMOL/L — SIGNIFICANT CHANGE UP (ref 17–32)
CREAT SERPL-MCNC: 0.7 MG/DL — SIGNIFICANT CHANGE UP (ref 0.7–1.5)
EGFR: 95 ML/MIN/1.73M2 — SIGNIFICANT CHANGE UP
EGFR: 95 ML/MIN/1.73M2 — SIGNIFICANT CHANGE UP
EOSINOPHIL # BLD AUTO: 0.04 K/UL — SIGNIFICANT CHANGE UP (ref 0–0.7)
EOSINOPHIL NFR BLD AUTO: 0.6 % — SIGNIFICANT CHANGE UP (ref 0–8)
ESTIMATED AVERAGE GLUCOSE: 117 MG/DL — HIGH (ref 68–114)
GLUCOSE SERPL-MCNC: 91 MG/DL — SIGNIFICANT CHANGE UP (ref 70–99)
HCT VFR BLD CALC: 43.9 % — SIGNIFICANT CHANGE UP (ref 37–47)
HGB BLD-MCNC: 14.3 G/DL — SIGNIFICANT CHANGE UP (ref 12–16)
IMM GRANULOCYTES NFR BLD AUTO: 0.3 % — SIGNIFICANT CHANGE UP (ref 0.1–0.3)
INR BLD: 0.93 RATIO — SIGNIFICANT CHANGE UP (ref 0.65–1.3)
LYMPHOCYTES # BLD AUTO: 1.34 K/UL — SIGNIFICANT CHANGE UP (ref 1.2–3.4)
LYMPHOCYTES # BLD AUTO: 20.3 % — LOW (ref 20.5–51.1)
MCHC RBC-ENTMCNC: 29.9 PG — SIGNIFICANT CHANGE UP (ref 27–31)
MCHC RBC-ENTMCNC: 32.6 G/DL — SIGNIFICANT CHANGE UP (ref 32–37)
MCV RBC AUTO: 91.8 FL — SIGNIFICANT CHANGE UP (ref 81–99)
MONOCYTES # BLD AUTO: 0.62 K/UL — HIGH (ref 0.1–0.6)
MONOCYTES NFR BLD AUTO: 9.4 % — HIGH (ref 1.7–9.3)
MRSA PCR RESULT.: NEGATIVE — SIGNIFICANT CHANGE UP
NEUTROPHILS # BLD AUTO: 4.52 K/UL — SIGNIFICANT CHANGE UP (ref 1.4–6.5)
NEUTROPHILS NFR BLD AUTO: 68.6 % — SIGNIFICANT CHANGE UP (ref 42.2–75.2)
NRBC BLD AUTO-RTO: 0 /100 WBCS — SIGNIFICANT CHANGE UP (ref 0–0)
PLATELET # BLD AUTO: 236 K/UL — SIGNIFICANT CHANGE UP (ref 130–400)
PMV BLD: 9.5 FL — SIGNIFICANT CHANGE UP (ref 7.4–10.4)
POTASSIUM SERPL-MCNC: 4.6 MMOL/L — SIGNIFICANT CHANGE UP (ref 3.5–5)
POTASSIUM SERPL-SCNC: 4.6 MMOL/L — SIGNIFICANT CHANGE UP (ref 3.5–5)
PROT SERPL-MCNC: 6.7 G/DL — SIGNIFICANT CHANGE UP (ref 6–8)
PROTHROM AB SERPL-ACNC: 11 SEC — SIGNIFICANT CHANGE UP (ref 9.95–12.87)
RBC # BLD: 4.78 M/UL — SIGNIFICANT CHANGE UP (ref 4.2–5.4)
RBC # FLD: 13.5 % — SIGNIFICANT CHANGE UP (ref 11.5–14.5)
SODIUM SERPL-SCNC: 140 MMOL/L — SIGNIFICANT CHANGE UP (ref 135–146)
WBC # BLD: 6.59 K/UL — SIGNIFICANT CHANGE UP (ref 4.8–10.8)
WBC # FLD AUTO: 6.59 K/UL — SIGNIFICANT CHANGE UP (ref 4.8–10.8)

## 2025-03-25 PROCEDURE — 86901 BLOOD TYPING SEROLOGIC RH(D): CPT

## 2025-03-25 PROCEDURE — 83036 HEMOGLOBIN GLYCOSYLATED A1C: CPT

## 2025-03-25 PROCEDURE — 85025 COMPLETE CBC W/AUTO DIFF WBC: CPT

## 2025-03-25 PROCEDURE — 73502 X-RAY EXAM HIP UNI 2-3 VIEWS: CPT | Mod: 26,LT

## 2025-03-25 PROCEDURE — 80053 COMPREHEN METABOLIC PANEL: CPT

## 2025-03-25 PROCEDURE — 85610 PROTHROMBIN TIME: CPT

## 2025-03-25 PROCEDURE — 87641 MR-STAPH DNA AMP PROBE: CPT

## 2025-03-25 PROCEDURE — 87640 STAPH A DNA AMP PROBE: CPT

## 2025-03-25 PROCEDURE — 73502 X-RAY EXAM HIP UNI 2-3 VIEWS: CPT | Mod: LT

## 2025-03-25 PROCEDURE — 99214 OFFICE O/P EST MOD 30 MIN: CPT | Mod: 25

## 2025-03-25 PROCEDURE — 36415 COLL VENOUS BLD VENIPUNCTURE: CPT

## 2025-03-25 PROCEDURE — 93005 ELECTROCARDIOGRAM TRACING: CPT

## 2025-03-25 PROCEDURE — 86850 RBC ANTIBODY SCREEN: CPT

## 2025-03-25 PROCEDURE — 86900 BLOOD TYPING SEROLOGIC ABO: CPT

## 2025-03-25 PROCEDURE — 85730 THROMBOPLASTIN TIME PARTIAL: CPT

## 2025-03-25 PROCEDURE — 93010 ELECTROCARDIOGRAM REPORT: CPT

## 2025-03-25 NOTE — H&P PST ADULT - HISTORY OF PRESENT ILLNESS
Patient is a  69 year old ___male presenting to PAST in preparation for ______ on ______ under _______ anesthesia by  _________ .  PATIENT CURRENTLY DENIES CHEST PAIN  SHORTNESS OF BREATH  PALPITATIONS,  DYSURIA, OR UPPER RESPIRATORY INFECTION IN PAST 2 WEEKS        Anesthesia Alert  NO--Difficult Airway  NO--History of neck surgery or radiation  NO--Limited ROM of neck  NO--History of Malignant hyperthermia  NO--Personal or family history of Pseudocholinesterase deficiency  NO--Prior Anesthesia Complication  NO--Latex Allergy  NO--Loose teeth  NO--History of Rheumatoid Arthritis  NO--ARTHUR  NO-- BLEEDING RISK  NO--Other_____    As per patient, this is their complete medical and surgical history, including medications both prescribed or over the counter.  Patient verbalized understanding of instructions and was given the opportunity to ask questions and have them answered.   Patient is a  69 year old female presenting to PAST in preparation for  Left Total Hip Replacement on 4/9 under regional  anesthesia by Dr. Raya  reports increased pain to left hip. Rates pain 6/10 worsens with activity improves with rest worsens withactivity  PATIENT CURRENTLY DENIES CHEST PAIN  SHORTNESS OF BREATH  PALPITATIONS,  DYSURIA, OR UPPER RESPIRATORY INFECTION IN PAST 2 WEEKS        Anesthesia Alert  NO--Difficult Airway  NO--History of neck surgery or radiation  NO--Limited ROM of neck  NO--History of Malignant hyperthermia  NO--Personal or family history of Pseudocholinesterase deficiency  NO--Prior Anesthesia Complication  NO--Latex Allergy  NO--Loose teeth  NO--History of Rheumatoid Arthritis  NO--ARTHUR  NO-- BLEEDING RISK  NO--Other_____          Duke Activity Status Index (DASI) from [employer]  on 3/25/2025      RESULT SUMMARY:  24.2 points  The higher the score (maximum 58.2), the higher the functional status.    5.72 METs        INPUTS:  Take care of self —> 2.75 = Yes  Walk indoors —> 1.75 = Yes  Walk 1&ndash;2 blocks on level ground —> 2.75 = Yes  Climb a flight of stairs or walk up a hill —> 5.5 = Yes  Run a short distance —> 0 = No  Do light work around the house —> 2.7 = Yes  Do moderate work around the house —> 3.5 = Yes  Do heavy work around the house —> 0 = No  Do yardwork —> 0 = No  Have sexual relations —> 5.25 = Yes  Participate in moderate recreational activities —> 0 = No  Participate in strenuous sports —> 0 = No          Revised Cardiac Risk Index for Pre-Operative Risk from [employer]  on 3/25/2025      RESULT SUMMARY:  0 points  RCRI Score    3.9 %  Risk of major cardiac event      INPUTS:  High-risk surgery —> 0 = No  History of ischemic heart disease —> 0 = No  History of congestive heart failure —> 0 = No  History of cerebrovascular disease —> 0 = No  Pre-operative treatment with insulin —> 0 = No  Pre-operative creatinine >2 mg/dL / 176.8 µmol/L —> 0 = No        As per patient, this is their complete medical and surgical history, including medications both prescribed or over the counter.  Patient verbalized understanding of instructions and was given the opportunity to ask questions and have them answered.

## 2025-03-25 NOTE — H&P PST ADULT - NSICDXPASTMEDICALHX_GEN_ALL_CORE_FT
PAST MEDICAL HISTORY:  High cholesterol     Left hip pain     Morbid obesity BMI=40    Seasonal allergies

## 2025-03-26 DIAGNOSIS — M16.12 UNILATERAL PRIMARY OSTEOARTHRITIS, LEFT HIP: ICD-10-CM

## 2025-03-26 DIAGNOSIS — Z01.818 ENCOUNTER FOR OTHER PREPROCEDURAL EXAMINATION: ICD-10-CM

## 2025-04-01 PROBLEM — M25.552 PAIN IN LEFT HIP: Chronic | Status: ACTIVE | Noted: 2025-03-25

## 2025-04-01 PROBLEM — J30.2 OTHER SEASONAL ALLERGIC RHINITIS: Chronic | Status: ACTIVE | Noted: 2025-03-25

## 2025-04-09 ENCOUNTER — APPOINTMENT (OUTPATIENT)
Dept: ORTHOPEDIC SURGERY | Facility: HOSPITAL | Age: 70
End: 2025-04-09

## 2025-04-09 ENCOUNTER — RESULT REVIEW (OUTPATIENT)
Age: 70
End: 2025-04-09

## 2025-04-09 ENCOUNTER — TRANSCRIPTION ENCOUNTER (OUTPATIENT)
Age: 70
End: 2025-04-09

## 2025-04-09 ENCOUNTER — INPATIENT (INPATIENT)
Facility: HOSPITAL | Age: 70
LOS: 0 days | Discharge: HOME HEALTH | DRG: 470 | End: 2025-04-10
Attending: ORTHOPAEDIC SURGERY | Admitting: ORTHOPAEDIC SURGERY
Payer: MEDICARE

## 2025-04-09 VITALS
RESPIRATION RATE: 17 BRPM | WEIGHT: 229.94 LBS | HEIGHT: 65 IN | TEMPERATURE: 99 F | SYSTOLIC BLOOD PRESSURE: 130 MMHG | OXYGEN SATURATION: 99 % | HEART RATE: 87 BPM | DIASTOLIC BLOOD PRESSURE: 79 MMHG

## 2025-04-09 DIAGNOSIS — Z98.41 CATARACT EXTRACTION STATUS, RIGHT EYE: Chronic | ICD-10-CM

## 2025-04-09 DIAGNOSIS — Z90.710 ACQUIRED ABSENCE OF BOTH CERVIX AND UTERUS: Chronic | ICD-10-CM

## 2025-04-09 DIAGNOSIS — L72.9 FOLLICULAR CYST OF THE SKIN AND SUBCUTANEOUS TISSUE, UNSPECIFIED: Chronic | ICD-10-CM

## 2025-04-09 DIAGNOSIS — M16.12 UNILATERAL PRIMARY OSTEOARTHRITIS, LEFT HIP: ICD-10-CM

## 2025-04-09 LAB — GLUCOSE BLDC GLUCOMTR-MCNC: 113 MG/DL — HIGH (ref 70–99)

## 2025-04-09 PROCEDURE — 97116 GAIT TRAINING THERAPY: CPT | Mod: GP

## 2025-04-09 PROCEDURE — 94010 BREATHING CAPACITY TEST: CPT

## 2025-04-09 PROCEDURE — 88311 DECALCIFY TISSUE: CPT | Mod: 26

## 2025-04-09 PROCEDURE — 88305 TISSUE EXAM BY PATHOLOGIST: CPT

## 2025-04-09 PROCEDURE — 97165 OT EVAL LOW COMPLEX 30 MIN: CPT | Mod: GO

## 2025-04-09 PROCEDURE — 80048 BASIC METABOLIC PNL TOTAL CA: CPT

## 2025-04-09 PROCEDURE — 88311 DECALCIFY TISSUE: CPT

## 2025-04-09 PROCEDURE — C1776: CPT

## 2025-04-09 PROCEDURE — 97110 THERAPEUTIC EXERCISES: CPT | Mod: GP

## 2025-04-09 PROCEDURE — 97162 PT EVAL MOD COMPLEX 30 MIN: CPT | Mod: GP

## 2025-04-09 PROCEDURE — 36415 COLL VENOUS BLD VENIPUNCTURE: CPT

## 2025-04-09 PROCEDURE — 88305 TISSUE EXAM BY PATHOLOGIST: CPT | Mod: 26

## 2025-04-09 PROCEDURE — 27130 TOTAL HIP ARTHROPLASTY: CPT | Mod: LT

## 2025-04-09 PROCEDURE — 73501 X-RAY EXAM HIP UNI 1 VIEW: CPT | Mod: LT

## 2025-04-09 PROCEDURE — 85027 COMPLETE CBC AUTOMATED: CPT

## 2025-04-09 RX ORDER — KETOROLAC TROMETHAMINE 30 MG/ML
15 INJECTION, SOLUTION INTRAMUSCULAR; INTRAVENOUS EVERY 6 HOURS
Refills: 0 | Status: DISCONTINUED | OUTPATIENT
Start: 2025-04-09 | End: 2025-04-10

## 2025-04-09 RX ORDER — HYDROMORPHONE/SOD CHLOR,ISO/PF 2 MG/10 ML
0.5 SYRINGE (ML) INJECTION
Refills: 0 | Status: DISCONTINUED | OUTPATIENT
Start: 2025-04-09 | End: 2025-04-09

## 2025-04-09 RX ORDER — SENNA 187 MG
2 TABLET ORAL AT BEDTIME
Refills: 0 | Status: DISCONTINUED | OUTPATIENT
Start: 2025-04-09 | End: 2025-04-10

## 2025-04-09 RX ORDER — CELECOXIB 50 MG/1
400 CAPSULE ORAL ONCE
Refills: 0 | Status: COMPLETED | OUTPATIENT
Start: 2025-04-09 | End: 2025-04-09

## 2025-04-09 RX ORDER — ONDANSETRON HCL/PF 4 MG/2 ML
4 VIAL (ML) INJECTION EVERY 6 HOURS
Refills: 0 | Status: DISCONTINUED | OUTPATIENT
Start: 2025-04-09 | End: 2025-04-10

## 2025-04-09 RX ORDER — METOCLOPRAMIDE HCL 10 MG
10 TABLET ORAL ONCE
Refills: 0 | Status: DISCONTINUED | OUTPATIENT
Start: 2025-04-09 | End: 2025-04-09

## 2025-04-09 RX ORDER — ACETAMINOPHEN 500 MG/5ML
1000 LIQUID (ML) ORAL ONCE
Refills: 0 | Status: DISCONTINUED | OUTPATIENT
Start: 2025-04-09 | End: 2025-04-09

## 2025-04-09 RX ORDER — ONDANSETRON HCL/PF 4 MG/2 ML
4 VIAL (ML) INJECTION ONCE
Refills: 0 | Status: DISCONTINUED | OUTPATIENT
Start: 2025-04-09 | End: 2025-04-09

## 2025-04-09 RX ORDER — TRAMADOL HYDROCHLORIDE 50 MG/1
50 TABLET, FILM COATED ORAL EVERY 4 HOURS
Refills: 0 | Status: DISCONTINUED | OUTPATIENT
Start: 2025-04-09 | End: 2025-04-10

## 2025-04-09 RX ORDER — DEXAMETHASONE 0.5 MG/1
4 TABLET ORAL ONCE
Refills: 0 | Status: DISCONTINUED | OUTPATIENT
Start: 2025-04-10 | End: 2025-04-10

## 2025-04-09 RX ORDER — SODIUM CHLORIDE 9 G/1000ML
1000 INJECTION, SOLUTION INTRAVENOUS
Refills: 0 | Status: DISCONTINUED | OUTPATIENT
Start: 2025-04-09 | End: 2025-04-09

## 2025-04-09 RX ORDER — POLYETHYLENE GLYCOL 3350 17 G/17G
17 POWDER, FOR SOLUTION ORAL AT BEDTIME
Refills: 0 | Status: DISCONTINUED | OUTPATIENT
Start: 2025-04-09 | End: 2025-04-10

## 2025-04-09 RX ORDER — CEFAZOLIN SODIUM IN 0.9 % NACL 3 G/100 ML
2000 INTRAVENOUS SOLUTION, PIGGYBACK (ML) INTRAVENOUS EVERY 8 HOURS
Refills: 0 | Status: COMPLETED | OUTPATIENT
Start: 2025-04-09 | End: 2025-04-10

## 2025-04-09 RX ORDER — VIBEGRON 75 MG/1
1 TABLET, FILM COATED ORAL
Refills: 0 | DISCHARGE

## 2025-04-09 RX ORDER — ATORVASTATIN CALCIUM 80 MG/1
10 TABLET, FILM COATED ORAL AT BEDTIME
Refills: 0 | Status: DISCONTINUED | OUTPATIENT
Start: 2025-04-09 | End: 2025-04-10

## 2025-04-09 RX ORDER — IBUPROFEN 200 MG
400 TABLET ORAL EVERY 8 HOURS
Refills: 0 | Status: DISCONTINUED | OUTPATIENT
Start: 2025-04-10 | End: 2025-04-10

## 2025-04-09 RX ORDER — B1/B2/B3/B5/B6/B12/VIT C/FOLIC 500-0.5 MG
1 TABLET ORAL
Refills: 0 | DISCHARGE

## 2025-04-09 RX ORDER — ACETAMINOPHEN 500 MG/5ML
650 LIQUID (ML) ORAL EVERY 6 HOURS
Refills: 0 | Status: DISCONTINUED | OUTPATIENT
Start: 2025-04-09 | End: 2025-04-10

## 2025-04-09 RX ORDER — MAGNESIUM, ALUMINUM HYDROXIDE 200-200 MG
30 TABLET,CHEWABLE ORAL
Refills: 0 | Status: DISCONTINUED | OUTPATIENT
Start: 2025-04-09 | End: 2025-04-10

## 2025-04-09 RX ORDER — OXYCODONE HYDROCHLORIDE 30 MG/1
5 TABLET ORAL ONCE
Refills: 0 | Status: DISCONTINUED | OUTPATIENT
Start: 2025-04-09 | End: 2025-04-09

## 2025-04-09 RX ORDER — ASPIRIN 325 MG
81 TABLET ORAL
Refills: 0 | Status: DISCONTINUED | OUTPATIENT
Start: 2025-04-10 | End: 2025-04-10

## 2025-04-09 RX ORDER — MAGNESIUM HYDROXIDE 400 MG/5ML
30 SUSPENSION ORAL DAILY
Refills: 0 | Status: DISCONTINUED | OUTPATIENT
Start: 2025-04-09 | End: 2025-04-10

## 2025-04-09 RX ADMIN — POLYETHYLENE GLYCOL 3350 17 GRAM(S): 17 POWDER, FOR SOLUTION ORAL at 21:07

## 2025-04-09 RX ADMIN — Medication 650 MILLIGRAM(S): at 18:57

## 2025-04-09 RX ADMIN — KETOROLAC TROMETHAMINE 15 MILLIGRAM(S): 30 INJECTION, SOLUTION INTRAMUSCULAR; INTRAVENOUS at 18:57

## 2025-04-09 RX ADMIN — KETOROLAC TROMETHAMINE 15 MILLIGRAM(S): 30 INJECTION, SOLUTION INTRAMUSCULAR; INTRAVENOUS at 23:36

## 2025-04-09 RX ADMIN — Medication 650 MILLIGRAM(S): at 18:16

## 2025-04-09 RX ADMIN — KETOROLAC TROMETHAMINE 15 MILLIGRAM(S): 30 INJECTION, SOLUTION INTRAMUSCULAR; INTRAVENOUS at 18:15

## 2025-04-09 RX ADMIN — Medication 100 MILLIGRAM(S): at 18:19

## 2025-04-09 RX ADMIN — Medication 10 MILLILITER(S): at 20:05

## 2025-04-09 RX ADMIN — Medication 2 TABLET(S): at 21:07

## 2025-04-09 RX ADMIN — CELECOXIB 400 MILLIGRAM(S): 50 CAPSULE ORAL at 08:27

## 2025-04-09 RX ADMIN — ATORVASTATIN CALCIUM 10 MILLIGRAM(S): 80 TABLET, FILM COATED ORAL at 21:07

## 2025-04-09 RX ADMIN — SODIUM CHLORIDE 75 MILLILITER(S): 9 INJECTION, SOLUTION INTRAVENOUS at 12:46

## 2025-04-09 RX ADMIN — Medication 650 MILLIGRAM(S): at 23:36

## 2025-04-09 RX ADMIN — CELECOXIB 400 MILLIGRAM(S): 50 CAPSULE ORAL at 07:58

## 2025-04-09 NOTE — CHART NOTE - NSCHARTNOTEFT_GEN_A_CORE
PACU ANESTHESIA ADMISSION NOTE      Procedure: KEVYN - total hip arthroplasty      Post op diagnosis:  OA (osteoarthritis) of hip            __x__  Patent Airway    __x__  Full return of protective reflexes    __x__  Full recovery from anesthesia / back to baseline status    Vitals:  T(C): 36.4 (04-09-25 @ 16:11), Max: 37.2 (04-09-25 @ 08:13)  HR: 73 (04-09-25 @ 16:11) (52 - 87)  BP: 137/97 (04-09-25 @ 16:11) (97/55 - 137/97)  RR: 18 (04-09-25 @ 16:11) (16 - 22)  SpO2: 100% (04-09-25 @ 16:11) (97% - 100%)    Mental Status:  __x__ Awake   ___x__ Alert   _____ Drowsy   _____ Sedated    Nausea/Vomiting:  __x__ NO  ______Yes,   See Post - Op Orders          Pain Scale (0-10):  _____    Treatment: ____ None    __x__ See Post - Op/PCA Orders    Post - Operative Fluids:   ____ Oral   __x__ See Post - Op Orders    Plan: Discharge:   ____Home       ___x__Floor     _____Critical Care    _____  Other:_________________    Comments: Patient had smooth intraoperative event, no anesthesia complications noted. Patient hemodynamically stable on arrival to PACU.  Report given to RN. Discharge when appropriate PACU criteria met.

## 2025-04-09 NOTE — PATIENT PROFILE ADULT - HAS THE PATIENT RECEIVED THE INFLUENZA VACCINE THIS SEASON?
yes... Aklief Pregnancy And Lactation Text: It is unknown if this medication is safe to use during pregnancy.  It is unknown if this medication is excreted in breast milk.  Breastfeeding women should use the topical cream on the smallest area of the skin for the shortest time needed while breastfeeding.  Do not apply to nipple and areola.

## 2025-04-09 NOTE — PHYSICAL THERAPY INITIAL EVALUATION ADULT - PERTINENT HX OF CURRENT PROBLEM, REHAB EVAL
Pt is a 68 y/o female with dx of elective L THR with h/o L hip OA under regional anesthesia with direct anterior approach seen pod #0.

## 2025-04-09 NOTE — ASU PATIENT PROFILE, ADULT - NSICDXPASTMEDICALHX_GEN_ALL_CORE_FT
PAST MEDICAL HISTORY:  Former smoker     High cholesterol     Left hip pain     Morbid obesity BMI=40    Seasonal allergies

## 2025-04-09 NOTE — ASU PATIENT PROFILE, ADULT - FALL HARM RISK - HARM RISK INTERVENTIONS

## 2025-04-09 NOTE — PHYSICAL THERAPY INITIAL EVALUATION ADULT - GENERAL OBSERVATIONS, REHAB EVAL
19:30-20:00. Chart reviewed; confirmed with RN to see the pt for PT. Pt ready for PT; received in chair with complain of 4/10 pain in L hip. +hep lock, +Mepilex L hip. Agreeable for PT evaluation.

## 2025-04-09 NOTE — PHYSICAL THERAPY INITIAL EVALUATION ADULT - LIVES WITH, PROFILE
in a private home with 3 steps to enter with L handrail going up + landing + 1 step with a rail and one big step at the door to enter and 10 inside to the basement with R handrail going down./parents

## 2025-04-09 NOTE — DISCHARGE NOTE PROVIDER - NSDCMRMEDTOKEN_GEN_ALL_CORE_FT
atorvastatin 10 mg oral tablet: 1 tab(s) orally once a day  Caltrate 600 mg oral tablet: 2 tab(s) orally once a day  Claritin 10 mg oral tablet: 1 tab(s) orally once a day  Gemtesa 75 mg oral tablet: 1 tab(s) orally once a day  Multiple Vitamins oral tablet: 1 tab(s) orally once a day   acetaminophen 325 mg oral tablet: 2 tab(s) orally every 6 hours post op pain  aspirin 81 mg oral tablet, chewable: 1 tab(s) orally 2 times a day x 30 days after Total Knee Arthroplasty to lower the risk of blood clots  atorvastatin 10 mg oral tablet: 1 tab(s) orally once a day  Caltrate 600 mg oral tablet: 2 tab(s) orally once a day  Claritin 10 mg oral tablet: 1 tab(s) orally once a day  Gemtesa 75 mg oral tablet: 1 tab(s) orally once a day  ibuprofen 400 mg oral tablet: 1 tab(s) orally every 8 hours post op pain  Multiple Vitamins oral tablet: 1 tab(s) orally once a day  pantoprazole 40 mg oral delayed release tablet: 1 tab(s) orally once a day (before a meal) GI prophylaxis for 30 days after Total Hip Arthroplasty  senna leaf extract oral tablet: 2 tab(s) orally once a day (at bedtime) while on pain medication to prevent constipation  traMADol 50 mg oral tablet: 1 tab(s) orally every 4 to 6 hours as needed for Severe Pain (7 - 10) post op pain MDD: 5   acetaminophen 325 mg oral tablet: 2 tab(s) orally every 6 hours post op pain  aspirin 81 mg oral tablet, chewable: 1 tab(s) orally 2 times a day x 30 days after Total Knee Arthroplasty to lower the risk of blood clots  atorvastatin 10 mg oral tablet: 1 tab(s) orally once a day  Caltrate 600 mg oral tablet: 2 tab(s) orally once a day  Claritin 10 mg oral tablet: 1 tab(s) orally once a day  Gemtesa 75 mg oral tablet: 1 tab(s) orally once a day  ibuprofen 400 mg oral tablet: 1 tab(s) orally every 8 hours post op pain  Multiple Vitamins oral tablet: 1 tab(s) orally once a day  naloxone 4 mg/0.1 mL nasal spray: 1 spray(s) intranasally once as needed for od take as directed in the event of accidental overdose  pantoprazole 40 mg oral delayed release tablet: 1 tab(s) orally once a day (before a meal) GI prophylaxis for 30 days after Total Hip Arthroplasty  senna leaf extract oral tablet: 2 tab(s) orally once a day (at bedtime) while on pain medication to prevent constipation  traMADol 50 mg oral tablet: 1 tab(s) orally every 4 to 6 hours as needed for Severe Pain (7 - 10) post op pain MDD: 5

## 2025-04-09 NOTE — PROGRESS NOTE ADULT - SUBJECTIVE AND OBJECTIVE BOX
POST OP CHECK   69y Female POD #  0    S/P left Total Hip Arthroplasty     Patient seen and examined at bedside . The patient is awake and alert in NAD. No complaints of chest pain, SOB, N/V.  Pain is controlled, the patient is due to void and ambulate.     PAST MEDICAL & SURGICAL HISTORY:  High cholesterol    Morbid obesity  BMI=40    Seasonal allergies    Left hip pain    Former smoker    S/P bilateral cataract extraction    S/P hysterectomy    History of cholecystectomy    Skin cysts, generalized  removed from neck and back          MEDICATIONS  (STANDING):  acetaminophen     Tablet .. 650 milliGRAM(s) Oral every 6 hours  atorvastatin 10 milliGRAM(s) Oral at bedtime  ceFAZolin   IVPB 2000 milliGRAM(s) IV Intermittent every 8 hours  chlorhexidine 2% Cloths 1 Application(s) Topical <User Schedule>  ketorolac   Injectable 15 milliGRAM(s) IV Push every 6 hours  pantoprazole    Tablet 40 milliGRAM(s) Oral before breakfast  polyethylene glycol 3350 17 Gram(s) Oral at bedtime  senna 2 Tablet(s) Oral at bedtime  sodium chloride 0.9%. 1000 milliLiter(s) (10 mL/Hr) IV Continuous <Continuous>    MEDICATIONS  (PRN):  aluminum hydroxide/magnesium hydroxide/simethicone Suspension 30 milliLiter(s) Oral four times a day PRN Indigestion  magnesium hydroxide Suspension 30 milliLiter(s) Oral daily PRN Constipation  ondansetron Injectable 4 milliGRAM(s) IV Push every 6 hours PRN Nausea and/or Vomiting  traMADol 50 milliGRAM(s) Oral every 4 hours PRN Severe Pain (7 - 10)        Vital Signs Last 24 Hrs  T(C): 36.4 (09 Apr 2025 16:11), Max: 37.2 (09 Apr 2025 08:13)  T(F): 97.5 (09 Apr 2025 16:11), Max: 99 (09 Apr 2025 08:13)  HR: 73 (09 Apr 2025 16:11) (52 - 87)  BP: 137/97 (09 Apr 2025 16:11) (97/55 - 137/97)  BP(mean): --  RR: 18 (09 Apr 2025 16:11) (16 - 22)  SpO2: 100% (09 Apr 2025 16:11) (97% - 100%)                      PE:  The patient was seen and examined at bedside          A&OX3, NAD          left hip dressing small spot of staining C/D/I          Compartments soft, BLE Topher stockings and SCD in place          NVI, SILT           A/P:     # POD #  0     s/p left Total Hip Arthroplasty                 - OOB to Chair   -PT/OT - wbat  -post op abx x 2 doses  -Pain control - per pain protocol   -Incentive Spirometry   -DVT Prophylaxis - aspirin bid x 30 days   -GI ppx- continue Protonix   -f/u am labs   -discharge planning- home with home care

## 2025-04-09 NOTE — DISCHARGE NOTE PROVIDER - NSDCFUSCHEDAPPT_GEN_ALL_CORE_FT
German Raya  [facility] Physician [facility]  ON[address]THO [address]  Scheduled Appointment: 05/01/2025    Unknown, Doctor  [facility] PreAdmits  Scheduled Appointment: 05/10/2025    [facility] Physician [facility]  [address]  Scheduled Appointment: 05/10/2025    Rohan Prescott  [facility] Physician [facility]  PULMMED [address]  Scheduled Appointment: 05/30/2025

## 2025-04-09 NOTE — DISCHARGE NOTE PROVIDER - HOSPITAL COURSE
69 year old female admitted for an elective Total Hip Arthroplasty. The patient tolerated surgery well with no intra/post operative complications. The patient received intra/post operative antibiotics for infection prophylaxis and will be discharged on Aspirin 81mg twice daily for 30 days to lower the risk of blood clots. The patient worked with Physical Therapy while admitted to the hospital and is stable for discharge.

## 2025-04-09 NOTE — BRIEF OPERATIVE NOTE - COMMENTS
Depuy actis stem pinnacle cup Direct Anterior  approach no specific hip precautions wbat asa for  dvt prophylaxis ,  pain cocktail given vanco powder above and below the fascia  total 2 grams

## 2025-04-09 NOTE — ASU PATIENT PROFILE, ADULT - NSICDXPASTSURGICALHX_GEN_ALL_CORE_FT
PAST SURGICAL HISTORY:  History of cholecystectomy     S/P bilateral cataract extraction     S/P hysterectomy     Skin cysts, generalized removed from neck and back

## 2025-04-09 NOTE — PHYSICAL THERAPY INITIAL EVALUATION ADULT - MUSCLE TONE ASSESSMENT, REHAB EVAL
----- Message from Adriana Price MD sent at 9/5/2023  5:15 PM CDT -----  Excellent, lead less than 2    March 10, 2017               [facility]  [address] [place] [id number]  Phone: [phone]  Fax: [phone]   March 10, 2017     Patient: Le Almaguer   YOB: 1999   Date of Visit: 3/10/2017       To Whom it May Concern:    Le Almaguer was seen in my clinic on 3/10/2017. She may return to school on 3/13/2017.    If you have any questions or concerns, please don't hesitate to call.    Sincerely,     [place] Pascual, ANGIEN      normal

## 2025-04-09 NOTE — PRE-ANESTHESIA EVALUATION ADULT - NSANTHRISKNONERD_GEN_ALL_CORE
No risk alerts present
Quality 110: Preventive Care And Screening: Influenza Immunization: Influenza Immunization Administered during Influenza season
Detail Level: Detailed

## 2025-04-09 NOTE — DISCHARGE NOTE PROVIDER - CARE PROVIDER_API CALL
German Raya  Orthopaedic Surgery  [address] [facility] [place] [address]  Phone: [phone]  Fax: [phone]  Scheduled Appointment: 05/01/2025 02:15 PM

## 2025-04-10 ENCOUNTER — TRANSCRIPTION ENCOUNTER (OUTPATIENT)
Age: 70
End: 2025-04-10

## 2025-04-10 VITALS
SYSTOLIC BLOOD PRESSURE: 154 MMHG | DIASTOLIC BLOOD PRESSURE: 81 MMHG | TEMPERATURE: 98 F | OXYGEN SATURATION: 99 % | HEART RATE: 84 BPM | RESPIRATION RATE: 16 BRPM

## 2025-04-10 LAB
ANION GAP SERPL CALC-SCNC: 11 MMOL/L — SIGNIFICANT CHANGE UP (ref 7–14)
BUN SERPL-MCNC: 16 MG/DL — SIGNIFICANT CHANGE UP (ref 10–20)
CALCIUM SERPL-MCNC: 8.9 MG/DL — SIGNIFICANT CHANGE UP (ref 8.4–10.5)
CHLORIDE SERPL-SCNC: 107 MMOL/L — SIGNIFICANT CHANGE UP (ref 98–110)
CO2 SERPL-SCNC: 24 MMOL/L — SIGNIFICANT CHANGE UP (ref 17–32)
CREAT SERPL-MCNC: 0.7 MG/DL — SIGNIFICANT CHANGE UP (ref 0.7–1.5)
EGFR: 95 ML/MIN/1.73M2 — SIGNIFICANT CHANGE UP
EGFR: 95 ML/MIN/1.73M2 — SIGNIFICANT CHANGE UP
GLUCOSE SERPL-MCNC: 105 MG/DL — HIGH (ref 70–99)
HCT VFR BLD CALC: 40.3 % — SIGNIFICANT CHANGE UP (ref 37–47)
HGB BLD-MCNC: 13.3 G/DL — SIGNIFICANT CHANGE UP (ref 12–16)
MCHC RBC-ENTMCNC: 30.1 PG — SIGNIFICANT CHANGE UP (ref 27–31)
MCHC RBC-ENTMCNC: 33 G/DL — SIGNIFICANT CHANGE UP (ref 32–37)
MCV RBC AUTO: 91.2 FL — SIGNIFICANT CHANGE UP (ref 81–99)
NRBC BLD AUTO-RTO: 0 /100 WBCS — SIGNIFICANT CHANGE UP (ref 0–0)
PLATELET # BLD AUTO: 244 K/UL — SIGNIFICANT CHANGE UP (ref 130–400)
PMV BLD: 9.4 FL — SIGNIFICANT CHANGE UP (ref 7.4–10.4)
POTASSIUM SERPL-MCNC: 4.1 MMOL/L — SIGNIFICANT CHANGE UP (ref 3.5–5)
POTASSIUM SERPL-SCNC: 4.1 MMOL/L — SIGNIFICANT CHANGE UP (ref 3.5–5)
RBC # BLD: 4.42 M/UL — SIGNIFICANT CHANGE UP (ref 4.2–5.4)
RBC # FLD: 13.4 % — SIGNIFICANT CHANGE UP (ref 11.5–14.5)
SODIUM SERPL-SCNC: 142 MMOL/L — SIGNIFICANT CHANGE UP (ref 135–146)
WBC # BLD: 13.97 K/UL — HIGH (ref 4.8–10.8)
WBC # FLD AUTO: 13.97 K/UL — HIGH (ref 4.8–10.8)

## 2025-04-10 PROCEDURE — 99221 1ST HOSP IP/OBS SF/LOW 40: CPT

## 2025-04-10 RX ORDER — TRAMADOL HYDROCHLORIDE 50 MG/1
1 TABLET, FILM COATED ORAL
Qty: 35 | Refills: 0
Start: 2025-04-10

## 2025-04-10 RX ORDER — NALOXONE HYDROCHLORIDE 0.4 MG/ML
1 INJECTION, SOLUTION INTRAMUSCULAR; INTRAVENOUS; SUBCUTANEOUS
Qty: 1 | Refills: 0
Start: 2025-04-10

## 2025-04-10 RX ORDER — ASPIRIN 325 MG
1 TABLET ORAL
Qty: 60 | Refills: 0
Start: 2025-04-10 | End: 2025-05-09

## 2025-04-10 RX ORDER — ACETAMINOPHEN 500 MG/5ML
2 LIQUID (ML) ORAL
Qty: 112 | Refills: 0
Start: 2025-04-10 | End: 2025-04-23

## 2025-04-10 RX ORDER — SENNA 187 MG
2 TABLET ORAL
Qty: 30 | Refills: 0
Start: 2025-04-10

## 2025-04-10 RX ORDER — IBUPROFEN 200 MG
1 TABLET ORAL
Qty: 42 | Refills: 0
Start: 2025-04-10 | End: 2025-04-23

## 2025-04-10 RX ADMIN — Medication 650 MILLIGRAM(S): at 05:34

## 2025-04-10 RX ADMIN — KETOROLAC TROMETHAMINE 15 MILLIGRAM(S): 30 INJECTION, SOLUTION INTRAMUSCULAR; INTRAVENOUS at 12:00

## 2025-04-10 RX ADMIN — TRAMADOL HYDROCHLORIDE 50 MILLIGRAM(S): 50 TABLET, FILM COATED ORAL at 14:00

## 2025-04-10 RX ADMIN — KETOROLAC TROMETHAMINE 15 MILLIGRAM(S): 30 INJECTION, SOLUTION INTRAMUSCULAR; INTRAVENOUS at 06:26

## 2025-04-10 RX ADMIN — Medication 100 MILLIGRAM(S): at 01:21

## 2025-04-10 RX ADMIN — Medication 650 MILLIGRAM(S): at 12:00

## 2025-04-10 RX ADMIN — KETOROLAC TROMETHAMINE 15 MILLIGRAM(S): 30 INJECTION, SOLUTION INTRAMUSCULAR; INTRAVENOUS at 11:25

## 2025-04-10 RX ADMIN — Medication 650 MILLIGRAM(S): at 01:06

## 2025-04-10 RX ADMIN — Medication 40 MILLIGRAM(S): at 05:34

## 2025-04-10 RX ADMIN — Medication 650 MILLIGRAM(S): at 11:25

## 2025-04-10 RX ADMIN — TRAMADOL HYDROCHLORIDE 50 MILLIGRAM(S): 50 TABLET, FILM COATED ORAL at 13:34

## 2025-04-10 RX ADMIN — KETOROLAC TROMETHAMINE 15 MILLIGRAM(S): 30 INJECTION, SOLUTION INTRAMUSCULAR; INTRAVENOUS at 05:35

## 2025-04-10 RX ADMIN — Medication 81 MILLIGRAM(S): at 05:34

## 2025-04-10 RX ADMIN — KETOROLAC TROMETHAMINE 15 MILLIGRAM(S): 30 INJECTION, SOLUTION INTRAMUSCULAR; INTRAVENOUS at 01:06

## 2025-04-10 RX ADMIN — Medication 650 MILLIGRAM(S): at 06:26

## 2025-04-10 RX ADMIN — Medication 1 APPLICATION(S): at 05:39

## 2025-04-10 NOTE — OCCUPATIONAL THERAPY INITIAL EVALUATION ADULT - PERTINENT HX OF CURRENT PROBLEM, REHAB EVAL
Admitted OA (L) hip; s/p (L) KEVYN POD #1; regional anesthesia Admitted OA (L) hip; s/p (L) KEVYN POD #1; regional anesthesia; direct anterior approach

## 2025-04-10 NOTE — OCCUPATIONAL THERAPY INITIAL EVALUATION ADULT - PHYSICAL ASSIST/NONPHYSICAL ASSIST:DRESS LOWER BODY, OT EVAL
for sequencing/set-up required/supervision/1 person assist for sequencing/set-up required/supervision

## 2025-04-10 NOTE — PROGRESS NOTE ADULT - SUBJECTIVE AND OBJECTIVE BOX
69y Female POD #   1   S/P left Total Hip Arthroplasty     Patient seen and examined at bedside . The patient is awake and alert in NAD. No complaints of chest pain, SOB, N/V.  Pain is controlled, the patient has ambulated and is voiding.     PAST MEDICAL & SURGICAL HISTORY:  High cholesterol    Morbid obesity  BMI=40    Seasonal allergies    Left hip pain    Former smoker    S/P bilateral cataract extraction    S/P hysterectomy    History of cholecystectomy    Skin cysts, generalized  removed from neck and back          MEDICATIONS  (STANDING):  acetaminophen     Tablet .. 650 milliGRAM(s) Oral every 6 hours  aspirin  chewable 81 milliGRAM(s) Oral two times a day  atorvastatin 10 milliGRAM(s) Oral at bedtime  chlorhexidine 2% Cloths 1 Application(s) Topical <User Schedule>  dexAMETHasone     Tablet 4 milliGRAM(s) Oral once  ibuprofen  Tablet. 400 milliGRAM(s) Oral every 8 hours  ketorolac   Injectable 15 milliGRAM(s) IV Push every 6 hours  pantoprazole    Tablet 40 milliGRAM(s) Oral before breakfast  polyethylene glycol 3350 17 Gram(s) Oral at bedtime  senna 2 Tablet(s) Oral at bedtime  sodium chloride 0.9%. 1000 milliLiter(s) (10 mL/Hr) IV Continuous <Continuous>    MEDICATIONS  (PRN):  aluminum hydroxide/magnesium hydroxide/simethicone Suspension 30 milliLiter(s) Oral four times a day PRN Indigestion  magnesium hydroxide Suspension 30 milliLiter(s) Oral daily PRN Constipation  ondansetron Injectable 4 milliGRAM(s) IV Push every 6 hours PRN Nausea and/or Vomiting  traMADol 50 milliGRAM(s) Oral every 4 hours PRN Severe Pain (7 - 10)        Vital Signs Last 24 Hrs  T(C): 36.8 (10 Apr 2025 08:04), Max: 37.2 (09 Apr 2025 09:15)  T(F): 98.3 (10 Apr 2025 08:04), Max: 98.3 (10 Apr 2025 08:04)  HR: 83 (10 Apr 2025 08:04) (52 - 87)  BP: 110/61 (10 Apr 2025 08:04) (97/55 - 137/97)  BP(mean): --  RR: 16 (10 Apr 2025 08:04) (16 - 22)  SpO2: 94% (10 Apr 2025 04:35) (94% - 100%)                          13.3   13.97 )-----------( 244      ( 10 Apr 2025 07:29 )             40.3     04-10    142  |  107  |  16  ----------------------------<  105[H]  4.1   |  24  |  0.7    Ca    8.9      10 Apr 2025 07:29        Urinalysis Basic - ( 10 Apr 2025 07:29 )    Color: x / Appearance: x / SG: x / pH: x  Gluc: 105 mg/dL / Ketone: x  / Bili: x / Urobili: x   Blood: x / Protein: x / Nitrite: x   Leuk Esterase: x / RBC: x / WBC x   Sq Epi: x / Non Sq Epi: x / Bacteria: x            PE:  The patient was seen and examined at bedside          A&OX3, NAD          Aquacel  dressing C/D/I          Compartments soft, BLE Topher stockings and SCD in place          NVI, SILT           A/P:     # POD #  1     s/p left Total Hip Arthroplasty                 - OOB to Chair   -PT/OT - wbat  -post op abx complete  -Pain control - per pain protocol   -Incentive Spirometry   -DVT Prophylaxis - aspirin bid x 30 days   -GI ppx- continue Protonix  -discharge planning- home with home care                                69y Female POD #   1   S/P left Total Hip Arthroplasty     Patient seen and examined at bedside . The patient is awake and alert in NAD. No complaints of chest pain, SOB, N/V.  Pain is controlled, the patient has ambulated and is voiding.     PAST MEDICAL & SURGICAL HISTORY:  High cholesterol    Morbid obesity  BMI=40    Seasonal allergies    Left hip pain    Former smoker    S/P bilateral cataract extraction    S/P hysterectomy    History of cholecystectomy    Skin cysts, generalized  removed from neck and back          MEDICATIONS  (STANDING):  acetaminophen     Tablet .. 650 milliGRAM(s) Oral every 6 hours  aspirin  chewable 81 milliGRAM(s) Oral two times a day  atorvastatin 10 milliGRAM(s) Oral at bedtime  chlorhexidine 2% Cloths 1 Application(s) Topical <User Schedule>  dexAMETHasone     Tablet 4 milliGRAM(s) Oral once  ibuprofen  Tablet. 400 milliGRAM(s) Oral every 8 hours  ketorolac   Injectable 15 milliGRAM(s) IV Push every 6 hours  pantoprazole    Tablet 40 milliGRAM(s) Oral before breakfast  polyethylene glycol 3350 17 Gram(s) Oral at bedtime  senna 2 Tablet(s) Oral at bedtime  sodium chloride 0.9%. 1000 milliLiter(s) (10 mL/Hr) IV Continuous <Continuous>    MEDICATIONS  (PRN):  aluminum hydroxide/magnesium hydroxide/simethicone Suspension 30 milliLiter(s) Oral four times a day PRN Indigestion  magnesium hydroxide Suspension 30 milliLiter(s) Oral daily PRN Constipation  ondansetron Injectable 4 milliGRAM(s) IV Push every 6 hours PRN Nausea and/or Vomiting  traMADol 50 milliGRAM(s) Oral every 4 hours PRN Severe Pain (7 - 10)        Vital Signs Last 24 Hrs  T(C): 36.8 (10 Apr 2025 08:04), Max: 37.2 (09 Apr 2025 09:15)  T(F): 98.3 (10 Apr 2025 08:04), Max: 98.3 (10 Apr 2025 08:04)  HR: 83 (10 Apr 2025 08:04) (52 - 87)  BP: 110/61 (10 Apr 2025 08:04) (97/55 - 137/97)  BP(mean): --  RR: 16 (10 Apr 2025 08:04) (16 - 22)  SpO2: 94% (10 Apr 2025 04:35) (94% - 100%)                          13.3   13.97 )-----------( 244      ( 10 Apr 2025 07:29 )             40.3     04-10    142  |  107  |  16  ----------------------------<  105[H]  4.1   |  24  |  0.7    Ca    8.9      10 Apr 2025 07:29        Urinalysis Basic - ( 10 Apr 2025 07:29 )    Color: x / Appearance: x / SG: x / pH: x  Gluc: 105 mg/dL / Ketone: x  / Bili: x / Urobili: x   Blood: x / Protein: x / Nitrite: x   Leuk Esterase: x / RBC: x / WBC x   Sq Epi: x / Non Sq Epi: x / Bacteria: x            PE:  The patient was seen and examined at bedside          A&OX3, NAD          left hip  dressing C/D/I          Compartments soft, BLE Topher stockings and SCD in place          NVI, SILT           A/P:     # POD #  1     s/p left Total Hip Arthroplasty                 - OOB to Chair   -PT/OT - wbat  -post op abx complete  -Pain control - per pain protocol   -Incentive Spirometry   -DVT Prophylaxis - aspirin bid x 30 days   -GI ppx- continue Protonix  -discharge planning- home with home care

## 2025-04-10 NOTE — DISCHARGE NOTE NURSING/CASE MANAGEMENT/SOCIAL WORK - PATIENT PORTAL LINK FT
You can access the FollowMyHealth Patient Portal offered by [facility] by registering at the following website: [email or web address]. By joining [facility]’s FollowMyHealth portal, you will also be able to view your health information using other applications (apps) compatible with our system.

## 2025-04-10 NOTE — OCCUPATIONAL THERAPY INITIAL EVALUATION ADULT - PHYSICAL ASSIST/NONPHYSICAL ASSIST: TUB, REHAB EVAL
for hand placement and sequencing/supervision/1 person assist for hand placement and sequencing/verbal cues/1 person assist

## 2025-04-10 NOTE — OCCUPATIONAL THERAPY INITIAL EVALUATION ADULT - LIVES WITH, PROFILE
mother in a private home +3 steps to enter with (L) handrail to go up to landing +1 big step to the door then level inside +10 steps to the basement with (R) handrail to go down +tub +comfort height toilet +shower bench/parents

## 2025-04-10 NOTE — OCCUPATIONAL THERAPY INITIAL EVALUATION ADULT - ADDITIONAL COMMENTS
PLOF obtained from pt. Per pt, owns RW, SC, raised toilet seat, shower bench, leg , and reacher.  (+) driving

## 2025-04-10 NOTE — OCCUPATIONAL THERAPY INITIAL EVALUATION ADULT - GENERAL OBSERVATIONS, REHAB EVAL
8:50-9:30; chart reviewed, ok to treat by Occupational Therapist as confirmed by URBAN Marsh, Pt received semifowler +LUE heplock +BLE sequentials +(L) hip mepilex dressing in NAD. (L) hip mepilex dressing noted with dried minimal discharge; URBAN Marsh and URBAN Stack aware. Pt denied pain and in agreement with OT IE. 8:50-9:30; chart reviewed, ok to treat by Occupational Therapist as confirmed by URBAN Marsh, Pt received semifowler +LUE heplock +(L) hip mepilex dressing in NAD. (L) hip mepilex dressing noted with dried minimal discharge; URBAN Marsh and URBAN Stack aware. Pt denied pain and in agreement with OT IE.

## 2025-04-10 NOTE — DISCHARGE NOTE NURSING/CASE MANAGEMENT/SOCIAL WORK - NSDCPEFALRISK_GEN_ALL_CORE
For information on Fall & Injury Prevention, visit: [email or web address] OR  [email or web address] OR  https://www.cdc.gov/steadi/patient.html

## 2025-04-10 NOTE — OCCUPATIONAL THERAPY INITIAL EVALUATION ADULT - TRANSFER SAFETY CONCERNS NOTED: TUB, REHAB EVAL
Pt advised to spongebathe at this time. Pt to practice tub transfer with home therapist prior to performing independently to increase safety. Pt demonstrated good understanding and in agreement,/decreased weight-shifting ability Pt advised to sponge bathe at this time. Pt to practice tub transfer with home therapist prior to performing independently to increase safety. Pt demonstrated good understanding and in agreement,/decreased weight-shifting ability

## 2025-04-14 LAB — SURGICAL PATHOLOGY STUDY: SIGNIFICANT CHANGE UP

## 2025-04-24 DIAGNOSIS — Z90.710 ACQUIRED ABSENCE OF BOTH CERVIX AND UTERUS: ICD-10-CM

## 2025-04-24 DIAGNOSIS — J30.2 OTHER SEASONAL ALLERGIC RHINITIS: ICD-10-CM

## 2025-04-24 DIAGNOSIS — E78.00 PURE HYPERCHOLESTEROLEMIA, UNSPECIFIED: ICD-10-CM

## 2025-04-24 DIAGNOSIS — Z98.41 CATARACT EXTRACTION STATUS, RIGHT EYE: ICD-10-CM

## 2025-04-24 DIAGNOSIS — Z87.891 PERSONAL HISTORY OF NICOTINE DEPENDENCE: ICD-10-CM

## 2025-04-24 DIAGNOSIS — Z98.42 CATARACT EXTRACTION STATUS, LEFT EYE: ICD-10-CM

## 2025-04-24 DIAGNOSIS — E66.01 MORBID (SEVERE) OBESITY DUE TO EXCESS CALORIES: ICD-10-CM

## 2025-04-24 DIAGNOSIS — M16.12 UNILATERAL PRIMARY OSTEOARTHRITIS, LEFT HIP: ICD-10-CM

## 2025-05-01 ENCOUNTER — APPOINTMENT (OUTPATIENT)
Facility: CLINIC | Age: 70
End: 2025-05-01
Payer: MEDICARE

## 2025-05-01 DIAGNOSIS — Z96.642 PRESENCE OF LEFT ARTIFICIAL HIP JOINT: ICD-10-CM

## 2025-05-01 PROBLEM — Z87.891 PERSONAL HISTORY OF NICOTINE DEPENDENCE: Chronic | Status: ACTIVE | Noted: 2025-04-02

## 2025-05-01 PROCEDURE — 99024 POSTOP FOLLOW-UP VISIT: CPT

## 2025-05-01 PROCEDURE — 73502 X-RAY EXAM HIP UNI 2-3 VIEWS: CPT

## 2025-05-01 RX ORDER — AMOXICILLIN 500 MG/1
500 TABLET, FILM COATED ORAL
Qty: 20 | Refills: 0 | Status: ACTIVE | COMMUNITY
Start: 2025-05-01 | End: 1900-01-01

## 2025-05-02 NOTE — HISTORY OF PRESENT ILLNESS
[de-identified] : s/p Left RAN doing well postop course uncomplicated, home PT complete, progressing appropriately.  pain controlled (-)n/v/cp/sob  Left hip exam shows well healed incision NTTP No pain with PROM  Imaging:  AP and Lateral views were obtained of the hips, including the contralateral right hip for comparison and assessment of leg length. X-rays are negative for acute bone or soft tissue trauma. Left hip replacement in good alignment without radiographic evidence of complication.  Plan: continue home exercise activities as tolerated f/u at 6 weeks.

## 2025-05-08 RX ORDER — POLYETHYLENE GLYCOL 3350 AND ELECTROLYTES WITH LEMON FLAVOR 236; 22.74; 6.74; 5.86; 2.97 G/4L; G/4L; G/4L; G/4L; G/4L
236 POWDER, FOR SOLUTION ORAL
Qty: 1 | Refills: 0 | Status: ACTIVE | COMMUNITY
Start: 2025-05-08 | End: 1900-01-01

## 2025-05-10 ENCOUNTER — RESULT REVIEW (OUTPATIENT)
Age: 70
End: 2025-05-10

## 2025-05-10 ENCOUNTER — OUTPATIENT (OUTPATIENT)
Dept: OUTPATIENT SERVICES | Facility: HOSPITAL | Age: 70
LOS: 1 days | End: 2025-05-10
Payer: MEDICARE

## 2025-05-10 DIAGNOSIS — Z12.31 ENCOUNTER FOR SCREENING MAMMOGRAM FOR MALIGNANT NEOPLASM OF BREAST: ICD-10-CM

## 2025-05-10 DIAGNOSIS — L72.9 FOLLICULAR CYST OF THE SKIN AND SUBCUTANEOUS TISSUE, UNSPECIFIED: Chronic | ICD-10-CM

## 2025-05-10 DIAGNOSIS — Z90.710 ACQUIRED ABSENCE OF BOTH CERVIX AND UTERUS: Chronic | ICD-10-CM

## 2025-05-10 DIAGNOSIS — Z98.41 CATARACT EXTRACTION STATUS, RIGHT EYE: Chronic | ICD-10-CM

## 2025-05-10 PROCEDURE — 77063 BREAST TOMOSYNTHESIS BI: CPT | Mod: 26

## 2025-05-10 PROCEDURE — 77067 SCR MAMMO BI INCL CAD: CPT | Mod: 26

## 2025-05-10 PROCEDURE — 77063 BREAST TOMOSYNTHESIS BI: CPT

## 2025-05-10 PROCEDURE — 77067 SCR MAMMO BI INCL CAD: CPT

## 2025-05-11 DIAGNOSIS — Z12.31 ENCOUNTER FOR SCREENING MAMMOGRAM FOR MALIGNANT NEOPLASM OF BREAST: ICD-10-CM

## 2025-05-28 ENCOUNTER — NON-APPOINTMENT (OUTPATIENT)
Age: 70
End: 2025-05-28

## 2025-05-30 ENCOUNTER — APPOINTMENT (OUTPATIENT)
Dept: PULMONOLOGY | Facility: CLINIC | Age: 70
End: 2025-05-30
Payer: MEDICARE

## 2025-05-30 VITALS
DIASTOLIC BLOOD PRESSURE: 80 MMHG | OXYGEN SATURATION: 97 % | HEART RATE: 74 BPM | SYSTOLIC BLOOD PRESSURE: 142 MMHG | BODY MASS INDEX: 39.99 KG/M2 | HEIGHT: 65 IN | WEIGHT: 240 LBS

## 2025-05-30 DIAGNOSIS — G47.33 OBSTRUCTIVE SLEEP APNEA (ADULT) (PEDIATRIC): ICD-10-CM

## 2025-05-30 DIAGNOSIS — G47.30 HYPERSOMNIA, UNSPECIFIED: ICD-10-CM

## 2025-05-30 DIAGNOSIS — E66.9 OBESITY, UNSPECIFIED: ICD-10-CM

## 2025-05-30 DIAGNOSIS — G47.10 HYPERSOMNIA, UNSPECIFIED: ICD-10-CM

## 2025-05-30 PROCEDURE — G2211 COMPLEX E/M VISIT ADD ON: CPT

## 2025-05-30 PROCEDURE — 99203 OFFICE O/P NEW LOW 30 MIN: CPT

## 2025-05-30 NOTE — HISTORY OF PRESENT ILLNESS
[TextBox_4] : - Summary : The patient is here for evaluation of potential sleep apnea, presenting with a history of snoring and recent sleep disturbances. - Chief Complaint (CC) : Snoring and potential sleep apnea - History of Present Illness : The patient, a 69-year-old individual, presents with a long-standing history of snoring. The patient reports that snoring has been a consistent issue, with family members also exhibiting similar symptoms. Recently, the patient has been experiencing more restless sleep, which coincided with hip pain prior to a hip replacement surgery in April. The patient denies awareness of choking or gasping during sleep but mentions occasionally waking up with snorts. Daytime sleepiness is reported, with the patient falling asleep while watching television once or twice a week. The patient typically wakes up once during the night to use the bathroom and generally feels well-rested in the morning. However, there is a history of dozing off during commutes. The patient's mother, who is 96 years old and lives with the patient, has observed the patient snoring during daytime naps. The patient also reports sleep disturbances following a hysterectomy, experiencing hot flashes and frequent tossing and turning. - Past Medical History : The patient has a history of hypercholesterolemia, managed with low-dose medication. Recent hip replacement surgery was performed in April 2025. The patient also has a history of hysterectomy, which has led to menopausal symptoms affecting sleep quality.

## 2025-05-30 NOTE — ASSESSMENT
[FreeTextEntry1] : Assessment: There is a high clinical suspicion for BEN, the patient has classic signs of obstructive sleep apnea. Obesity There is EDS associated with the sleep apnea.   Plan: I will order home sleep testing, and I will see the patient in F/U to arrange for therapies as needed. Weight loss maintenance was discussed with the patient. The patient was counseled against driving in a sleepy state.

## 2025-06-12 RX ORDER — SULFAMETHOXAZOLE AND TRIMETHOPRIM 800; 160 MG/1; MG/1
800-160 TABLET ORAL
Qty: 40 | Refills: 0 | Status: ACTIVE | COMMUNITY
Start: 2025-06-12 | End: 1900-01-01

## 2025-06-17 ENCOUNTER — APPOINTMENT (OUTPATIENT)
Dept: ORTHOPEDIC SURGERY | Facility: CLINIC | Age: 70
End: 2025-06-17
Payer: MEDICARE

## 2025-06-17 PROCEDURE — 99024 POSTOP FOLLOW-UP VISIT: CPT

## 2025-06-19 ENCOUNTER — OUTPATIENT (OUTPATIENT)
Dept: OUTPATIENT SERVICES | Facility: HOSPITAL | Age: 70
LOS: 1 days | Discharge: HOME | End: 2025-06-19
Payer: MEDICARE

## 2025-06-19 ENCOUNTER — APPOINTMENT (OUTPATIENT)
Dept: SLEEP CENTER | Facility: HOSPITAL | Age: 70
End: 2025-06-19
Payer: MEDICARE

## 2025-06-19 DIAGNOSIS — Z90.710 ACQUIRED ABSENCE OF BOTH CERVIX AND UTERUS: Chronic | ICD-10-CM

## 2025-06-19 DIAGNOSIS — G47.33 OBSTRUCTIVE SLEEP APNEA (ADULT) (PEDIATRIC): ICD-10-CM

## 2025-06-19 DIAGNOSIS — L72.9 FOLLICULAR CYST OF THE SKIN AND SUBCUTANEOUS TISSUE, UNSPECIFIED: Chronic | ICD-10-CM

## 2025-06-19 DIAGNOSIS — Z98.41 CATARACT EXTRACTION STATUS, RIGHT EYE: Chronic | ICD-10-CM

## 2025-06-19 PROCEDURE — 95800 SLP STDY UNATTENDED: CPT | Mod: 26

## 2025-06-19 PROCEDURE — 95806 SLEEP STUDY UNATT&RESP EFFT: CPT

## 2025-06-21 DIAGNOSIS — G47.33 OBSTRUCTIVE SLEEP APNEA (ADULT) (PEDIATRIC): ICD-10-CM

## 2025-07-03 ENCOUNTER — APPOINTMENT (OUTPATIENT)
Facility: CLINIC | Age: 70
End: 2025-07-03
Payer: MEDICARE

## 2025-07-03 PROCEDURE — 99024 POSTOP FOLLOW-UP VISIT: CPT

## 2025-07-03 NOTE — HISTORY OF PRESENT ILLNESS
[de-identified] : Follow-up for wound check left hip. This point looks well-healed No signs of infection Has been off antibiotics Will follow-up in 1 year or sooner if any issues arise.

## 2025-07-15 ENCOUNTER — APPOINTMENT (OUTPATIENT)
Dept: PULMONOLOGY | Facility: CLINIC | Age: 70
End: 2025-07-15
Payer: MEDICARE

## 2025-07-15 PROCEDURE — G2211 COMPLEX E/M VISIT ADD ON: CPT | Mod: 2W

## 2025-07-15 PROCEDURE — 99213 OFFICE O/P EST LOW 20 MIN: CPT | Mod: 2W

## 2025-07-15 NOTE — HISTORY OF PRESENT ILLNESS
[TextBox_4] : s/p HST 6/19/25  Reports Snoring, nonrestorative sleep, daytime somnolence, and fatigue, Nocturia Sleep quality rated as: poor Daytime napping: yes

## 2025-07-15 NOTE — REASON FOR VISIT
[Home] : at home, [unfilled] , at the time of the visit. [Medical Office: ([facility])___] : at the medical office located in  [Telehealth (audio & video)] : This visit was provided via telehealth using real-time 2-way audio visual technology. [Verbal consent obtained from patient] : the patient, [unfilled] [Follow-Up] : a follow-up visit [Sleep Apnea] : sleep apnea [Obesity] : obesity

## 2025-07-15 NOTE — ASSESSMENT
[FreeTextEntry1] : Obesity -Discussed relationship between BEN and weight  -Weight loss encouraged -Indications for medical weight loss discussed.  BEN -HST (6/19/25) reviewed and d/w patient, Moderate BEN AHI 17.9/hr RDI 17.9/hr  -APAP recommended; pt would like to consider further and discuss w/ her PCP  -I notified her that her SS is valid for 6 months  -Pt educated about risks of inadequate management of BEN and/or CPAP noncompliance: stroke, MI, arrythmias, HTN, safety, depression or worsening of other commodities -Counseled against driving in sleepy states  Pt will contact office for f/u

## 2025-07-23 ENCOUNTER — TRANSCRIPTION ENCOUNTER (OUTPATIENT)
Age: 70
End: 2025-07-23

## 2025-07-23 ENCOUNTER — RESULT REVIEW (OUTPATIENT)
Age: 70
End: 2025-07-23

## 2025-07-23 ENCOUNTER — OUTPATIENT (OUTPATIENT)
Dept: OUTPATIENT SERVICES | Facility: HOSPITAL | Age: 70
LOS: 1 days | Discharge: HOME | End: 2025-07-23
Payer: MEDICARE

## 2025-07-23 VITALS
DIASTOLIC BLOOD PRESSURE: 78 MMHG | OXYGEN SATURATION: 100 % | TEMPERATURE: 97 F | SYSTOLIC BLOOD PRESSURE: 119 MMHG | RESPIRATION RATE: 18 BRPM | HEART RATE: 80 BPM | WEIGHT: 229.94 LBS | HEIGHT: 65 IN

## 2025-07-23 VITALS
SYSTOLIC BLOOD PRESSURE: 160 MMHG | HEART RATE: 77 BPM | DIASTOLIC BLOOD PRESSURE: 82 MMHG | RESPIRATION RATE: 16 BRPM | OXYGEN SATURATION: 99 %

## 2025-07-23 DIAGNOSIS — L72.9 FOLLICULAR CYST OF THE SKIN AND SUBCUTANEOUS TISSUE, UNSPECIFIED: Chronic | ICD-10-CM

## 2025-07-23 DIAGNOSIS — K59.00 CONSTIPATION, UNSPECIFIED: ICD-10-CM

## 2025-07-23 DIAGNOSIS — Z98.41 CATARACT EXTRACTION STATUS, RIGHT EYE: Chronic | ICD-10-CM

## 2025-07-23 DIAGNOSIS — Z12.11 ENCOUNTER FOR SCREENING FOR MALIGNANT NEOPLASM OF COLON: ICD-10-CM

## 2025-07-23 DIAGNOSIS — Z90.710 ACQUIRED ABSENCE OF BOTH CERVIX AND UTERUS: Chronic | ICD-10-CM

## 2025-07-23 PROCEDURE — 88305 TISSUE EXAM BY PATHOLOGIST: CPT

## 2025-07-23 PROCEDURE — 88305 TISSUE EXAM BY PATHOLOGIST: CPT | Mod: 26

## 2025-07-23 PROCEDURE — 45380 COLONOSCOPY AND BIOPSY: CPT

## 2025-07-23 NOTE — ASU DISCHARGE PLAN (ADULT/PEDIATRIC) - FINANCIAL ASSISTANCE
[facility] provides services at a reduced cost to those who are determined to be eligible through [facility]’s financial assistance program. Information regarding [facility]’s financial assistance program can be found by going to [email or web address] or by calling [phone].

## 2025-07-23 NOTE — ASU PATIENT PROFILE, ADULT - FALL HARM RISK - HARM RISK INTERVENTIONS

## 2025-07-24 LAB — SURGICAL PATHOLOGY STUDY: SIGNIFICANT CHANGE UP

## 2025-07-26 DIAGNOSIS — Z12.11 ENCOUNTER FOR SCREENING FOR MALIGNANT NEOPLASM OF COLON: ICD-10-CM

## 2025-07-26 DIAGNOSIS — K57.30 DIVERTICULOSIS OF LARGE INTESTINE WITHOUT PERFORATION OR ABSCESS WITHOUT BLEEDING: ICD-10-CM

## 2025-07-26 DIAGNOSIS — K64.4 RESIDUAL HEMORRHOIDAL SKIN TAGS: ICD-10-CM

## 2025-08-07 ENCOUNTER — APPOINTMENT (OUTPATIENT)
Facility: CLINIC | Age: 70
End: 2025-08-07

## 2025-09-02 ENCOUNTER — APPOINTMENT (OUTPATIENT)
Dept: PULMONOLOGY | Facility: CLINIC | Age: 70
End: 2025-09-02
Payer: MEDICARE

## 2025-09-02 VITALS
OXYGEN SATURATION: 97 % | HEIGHT: 65 IN | HEART RATE: 100 BPM | RESPIRATION RATE: 14 BRPM | BODY MASS INDEX: 38.32 KG/M2 | SYSTOLIC BLOOD PRESSURE: 142 MMHG | WEIGHT: 230 LBS | DIASTOLIC BLOOD PRESSURE: 82 MMHG

## 2025-09-02 DIAGNOSIS — E66.9 OBESITY, UNSPECIFIED: ICD-10-CM

## 2025-09-02 DIAGNOSIS — G47.33 OBSTRUCTIVE SLEEP APNEA (ADULT) (PEDIATRIC): ICD-10-CM

## 2025-09-02 PROCEDURE — 99214 OFFICE O/P EST MOD 30 MIN: CPT

## 2025-09-02 PROCEDURE — G2211 COMPLEX E/M VISIT ADD ON: CPT
